# Patient Record
Sex: FEMALE | Employment: OTHER | URBAN - METROPOLITAN AREA
[De-identification: names, ages, dates, MRNs, and addresses within clinical notes are randomized per-mention and may not be internally consistent; named-entity substitution may affect disease eponyms.]

---

## 2018-01-01 ENCOUNTER — APPOINTMENT (OUTPATIENT)
Dept: CT IMAGING | Age: 83
DRG: 193 | End: 2018-01-01
Attending: PHYSICIAN ASSISTANT
Payer: MEDICARE

## 2018-01-01 ENCOUNTER — APPOINTMENT (OUTPATIENT)
Dept: GENERAL RADIOLOGY | Age: 83
DRG: 193 | End: 2018-01-01
Attending: EMERGENCY MEDICINE
Payer: MEDICARE

## 2018-01-01 ENCOUNTER — HOSPITAL ENCOUNTER (INPATIENT)
Age: 83
LOS: 3 days | DRG: 193 | End: 2018-03-22
Attending: EMERGENCY MEDICINE | Admitting: INTERNAL MEDICINE
Payer: MEDICARE

## 2018-01-01 VITALS
OXYGEN SATURATION: 95 % | DIASTOLIC BLOOD PRESSURE: 50 MMHG | BODY MASS INDEX: 22.07 KG/M2 | HEART RATE: 81 BPM | RESPIRATION RATE: 16 BRPM | TEMPERATURE: 98 F | SYSTOLIC BLOOD PRESSURE: 96 MMHG | WEIGHT: 112.43 LBS | HEIGHT: 60 IN

## 2018-01-01 DIAGNOSIS — R09.02 HYPOXIA: Primary | ICD-10-CM

## 2018-01-01 DIAGNOSIS — R06.03 ACUTE RESPIRATORY DISTRESS: ICD-10-CM

## 2018-01-01 DIAGNOSIS — I27.20 PULMONARY HYPERTENSION (HCC): ICD-10-CM

## 2018-01-01 DIAGNOSIS — J18.9 COMMUNITY ACQUIRED PNEUMONIA, UNSPECIFIED LATERALITY: ICD-10-CM

## 2018-01-01 DIAGNOSIS — I35.0 NONRHEUMATIC AORTIC VALVE STENOSIS: ICD-10-CM

## 2018-01-01 LAB
ALBUMIN SERPL-MCNC: 3.5 G/DL (ref 3.4–5)
ALBUMIN/GLOB SERPL: 0.8 {RATIO} (ref 0.8–1.7)
ALP SERPL-CCNC: 47 U/L (ref 45–117)
ALT SERPL-CCNC: 27 U/L (ref 13–56)
ANION GAP SERPL CALC-SCNC: 7 MMOL/L (ref 3–18)
ANION GAP SERPL CALC-SCNC: 8 MMOL/L (ref 3–18)
APPEARANCE UR: ABNORMAL
ARTERIAL PATENCY WRIST A: YES
ARTERIAL PATENCY WRIST A: YES
AST SERPL-CCNC: 46 U/L (ref 15–37)
BACTERIA URNS QL MICRO: ABNORMAL /HPF
BASE EXCESS BLD CALC-SCNC: 11 MMOL/L
BASE EXCESS BLDV CALC-SCNC: 4 MMOL/L
BASOPHILS # BLD: 0 K/UL (ref 0–0.06)
BASOPHILS NFR BLD: 0 % (ref 0–2)
BDY SITE: ABNORMAL
BDY SITE: ABNORMAL
BILIRUB SERPL-MCNC: 0.5 MG/DL (ref 0.2–1)
BILIRUB UR QL: NEGATIVE
BNP SERPL-MCNC: 1571 PG/ML (ref 0–1800)
BODY TEMPERATURE: 100.5
BUN SERPL-MCNC: 21 MG/DL (ref 7–18)
BUN SERPL-MCNC: 25 MG/DL (ref 7–18)
BUN/CREAT SERPL: 22 (ref 12–20)
BUN/CREAT SERPL: 25 (ref 12–20)
CALCIUM SERPL-MCNC: 8.6 MG/DL (ref 8.5–10.1)
CALCIUM SERPL-MCNC: 9 MG/DL (ref 8.5–10.1)
CHLORIDE SERPL-SCNC: 100 MMOL/L (ref 100–108)
CHLORIDE SERPL-SCNC: 93 MMOL/L (ref 100–108)
CO2 SERPL-SCNC: 30 MMOL/L (ref 21–32)
CO2 SERPL-SCNC: 32 MMOL/L (ref 21–32)
COLOR UR: YELLOW
CREAT SERPL-MCNC: 0.85 MG/DL (ref 0.6–1.3)
CREAT SERPL-MCNC: 1.14 MG/DL (ref 0.6–1.3)
DIFFERENTIAL METHOD BLD: ABNORMAL
EOSINOPHIL # BLD: 0 K/UL (ref 0–0.4)
EOSINOPHIL NFR BLD: 0 % (ref 0–5)
EPITH CASTS URNS QL MICRO: ABNORMAL /LPF (ref 0–5)
ERYTHROCYTE [DISTWIDTH] IN BLOOD BY AUTOMATED COUNT: 13.3 % (ref 11.6–14.5)
ERYTHROCYTE [DISTWIDTH] IN BLOOD BY AUTOMATED COUNT: 13.7 % (ref 11.6–14.5)
EST. AVERAGE GLUCOSE BLD GHB EST-MCNC: 114 MG/DL
FLUAV AG NPH QL IA: NEGATIVE
FLUBV AG NOSE QL IA: NEGATIVE
GAS FLOW.O2 O2 DELIVERY SYS: ABNORMAL L/MIN
GAS FLOW.O2 O2 DELIVERY SYS: ABNORMAL L/MIN
GAS FLOW.O2 SETTING OXYMISER: 2.5 L/M
GLOBULIN SER CALC-MCNC: 4.4 G/DL (ref 2–4)
GLUCOSE SERPL-MCNC: 144 MG/DL (ref 74–99)
GLUCOSE SERPL-MCNC: 151 MG/DL (ref 74–99)
GLUCOSE UR STRIP.AUTO-MCNC: NEGATIVE MG/DL
HBA1C MFR BLD: 5.6 % (ref 4.5–5.6)
HCO3 BLD-SCNC: 36.3 MMOL/L (ref 22–26)
HCO3 BLDV-SCNC: 30.8 MMOL/L (ref 23–28)
HCT VFR BLD AUTO: 40.3 % (ref 35–45)
HCT VFR BLD AUTO: 41.2 % (ref 35–45)
HGB BLD-MCNC: 12.7 G/DL (ref 12–16)
HGB BLD-MCNC: 13.3 G/DL (ref 12–16)
HGB UR QL STRIP: ABNORMAL
KETONES UR QL STRIP.AUTO: NEGATIVE MG/DL
L PNEUMO AG UR QL IA: NEGATIVE
LACTATE SERPL-SCNC: 1 MMOL/L (ref 0.4–2)
LEUKOCYTE ESTERASE UR QL STRIP.AUTO: ABNORMAL
LYMPHOCYTES # BLD: 0.8 K/UL (ref 0.9–3.6)
LYMPHOCYTES NFR BLD: 7 % (ref 21–52)
MCH RBC QN AUTO: 31.4 PG (ref 24–34)
MCH RBC QN AUTO: 31.8 PG (ref 24–34)
MCHC RBC AUTO-ENTMCNC: 31.5 G/DL (ref 31–37)
MCHC RBC AUTO-ENTMCNC: 32.3 G/DL (ref 31–37)
MCV RBC AUTO: 100.8 FL (ref 74–97)
MCV RBC AUTO: 97.4 FL (ref 74–97)
MONOCYTES # BLD: 0.7 K/UL (ref 0.05–1.2)
MONOCYTES NFR BLD: 6 % (ref 3–10)
MUCOUS THREADS URNS QL MICRO: NEGATIVE /LPF
NEUTS SEG # BLD: 9.1 K/UL (ref 1.8–8)
NEUTS SEG NFR BLD: 87 % (ref 40–73)
NITRITE UR QL STRIP.AUTO: NEGATIVE
O2/TOTAL GAS SETTING VFR VENT: 21 %
PCO2 BLD: 67.8 MMHG (ref 35–45)
PCO2 BLDV: 67.4 MMHG (ref 41–51)
PH BLD: 7.34 [PH] (ref 7.35–7.45)
PH BLDV: 7.27 [PH] (ref 7.32–7.42)
PH UR STRIP: 5.5 [PH] (ref 5–8)
PLATELET # BLD AUTO: 181 K/UL (ref 135–420)
PLATELET # BLD AUTO: 203 K/UL (ref 135–420)
PMV BLD AUTO: 10 FL (ref 9.2–11.8)
PMV BLD AUTO: 10.3 FL (ref 9.2–11.8)
PO2 BLD: 104 MMHG (ref 80–100)
PO2 BLDV: 43 MMHG (ref 25–40)
POTASSIUM SERPL-SCNC: 3.7 MMOL/L (ref 3.5–5.5)
POTASSIUM SERPL-SCNC: 4.1 MMOL/L (ref 3.5–5.5)
PROT SERPL-MCNC: 7.9 G/DL (ref 6.4–8.2)
PROT UR STRIP-MCNC: 300 MG/DL
RBC # BLD AUTO: 4 M/UL (ref 4.2–5.3)
RBC # BLD AUTO: 4.23 M/UL (ref 4.2–5.3)
RBC #/AREA URNS HPF: ABNORMAL /HPF (ref 0–5)
S PNEUM AG UR QL: POSITIVE
SAO2 % BLD: 97 % (ref 92–97)
SAO2 % BLDV: 69 % (ref 65–88)
SERVICE CMNT-IMP: ABNORMAL
SERVICE CMNT-IMP: ABNORMAL
SODIUM SERPL-SCNC: 133 MMOL/L (ref 136–145)
SODIUM SERPL-SCNC: 137 MMOL/L (ref 136–145)
SP GR UR REFRACTOMETRY: 1.02 (ref 1–1.03)
SPECIMEN TYPE: ABNORMAL
SPECIMEN TYPE: ABNORMAL
TOTAL RESP. RATE, ITRR: 20
TOTAL RESP. RATE, ITRR: 24
UROBILINOGEN UR QL STRIP.AUTO: 0.2 EU/DL (ref 0.2–1)
WBC # BLD AUTO: 10.6 K/UL (ref 4.6–13.2)
WBC # BLD AUTO: 12.5 K/UL (ref 4.6–13.2)
WBC URNS QL MICRO: ABNORMAL /HPF (ref 0–5)

## 2018-01-01 PROCEDURE — 77030013140 HC MSK NEB VYRM -A

## 2018-01-01 PROCEDURE — 74011250637 HC RX REV CODE- 250/637: Performed by: INTERNAL MEDICINE

## 2018-01-01 PROCEDURE — 94640 AIRWAY INHALATION TREATMENT: CPT

## 2018-01-01 PROCEDURE — 83605 ASSAY OF LACTIC ACID: CPT | Performed by: EMERGENCY MEDICINE

## 2018-01-01 PROCEDURE — 81001 URINALYSIS AUTO W/SCOPE: CPT | Performed by: EMERGENCY MEDICINE

## 2018-01-01 PROCEDURE — 77010033678 HC OXYGEN DAILY

## 2018-01-01 PROCEDURE — 36415 COLL VENOUS BLD VENIPUNCTURE: CPT | Performed by: PHYSICIAN ASSISTANT

## 2018-01-01 PROCEDURE — 87450 LEGIONELLA PNEUMOPHILA AG, URINE: CPT | Performed by: INTERNAL MEDICINE

## 2018-01-01 PROCEDURE — 74011250636 HC RX REV CODE- 250/636: Performed by: EMERGENCY MEDICINE

## 2018-01-01 PROCEDURE — 74011000250 HC RX REV CODE- 250: Performed by: INTERNAL MEDICINE

## 2018-01-01 PROCEDURE — 93005 ELECTROCARDIOGRAM TRACING: CPT

## 2018-01-01 PROCEDURE — 74011250637 HC RX REV CODE- 250/637: Performed by: FAMILY MEDICINE

## 2018-01-01 PROCEDURE — 85025 COMPLETE CBC W/AUTO DIFF WBC: CPT | Performed by: EMERGENCY MEDICINE

## 2018-01-01 PROCEDURE — 94760 N-INVAS EAR/PLS OXIMETRY 1: CPT

## 2018-01-01 PROCEDURE — 87449 NOS EACH ORGANISM AG IA: CPT | Performed by: INTERNAL MEDICINE

## 2018-01-01 PROCEDURE — 71045 X-RAY EXAM CHEST 1 VIEW: CPT

## 2018-01-01 PROCEDURE — 87040 BLOOD CULTURE FOR BACTERIA: CPT | Performed by: EMERGENCY MEDICINE

## 2018-01-01 PROCEDURE — 80048 BASIC METABOLIC PNL TOTAL CA: CPT | Performed by: PHYSICIAN ASSISTANT

## 2018-01-01 PROCEDURE — 65660000000 HC RM CCU STEPDOWN

## 2018-01-01 PROCEDURE — 87804 INFLUENZA ASSAY W/OPTIC: CPT | Performed by: EMERGENCY MEDICINE

## 2018-01-01 PROCEDURE — 74011000250 HC RX REV CODE- 250: Performed by: EMERGENCY MEDICINE

## 2018-01-01 PROCEDURE — 97162 PT EVAL MOD COMPLEX 30 MIN: CPT

## 2018-01-01 PROCEDURE — 85027 COMPLETE CBC AUTOMATED: CPT | Performed by: PHYSICIAN ASSISTANT

## 2018-01-01 PROCEDURE — 71250 CT THORAX DX C-: CPT

## 2018-01-01 PROCEDURE — 74011250636 HC RX REV CODE- 250/636: Performed by: INTERNAL MEDICINE

## 2018-01-01 PROCEDURE — 76450000000

## 2018-01-01 PROCEDURE — G8978 MOBILITY CURRENT STATUS: HCPCS

## 2018-01-01 PROCEDURE — 96365 THER/PROPH/DIAG IV INF INIT: CPT

## 2018-01-01 PROCEDURE — G8979 MOBILITY GOAL STATUS: HCPCS

## 2018-01-01 PROCEDURE — 97116 GAIT TRAINING THERAPY: CPT

## 2018-01-01 PROCEDURE — 36600 WITHDRAWAL OF ARTERIAL BLOOD: CPT

## 2018-01-01 PROCEDURE — 83880 ASSAY OF NATRIURETIC PEPTIDE: CPT | Performed by: HOSPITALIST

## 2018-01-01 PROCEDURE — 99285 EMERGENCY DEPT VISIT HI MDM: CPT

## 2018-01-01 PROCEDURE — 96375 TX/PRO/DX INJ NEW DRUG ADDON: CPT

## 2018-01-01 PROCEDURE — 80053 COMPREHEN METABOLIC PANEL: CPT | Performed by: EMERGENCY MEDICINE

## 2018-01-01 PROCEDURE — 82803 BLOOD GASES ANY COMBINATION: CPT

## 2018-01-01 PROCEDURE — 83036 HEMOGLOBIN GLYCOSYLATED A1C: CPT | Performed by: PHYSICIAN ASSISTANT

## 2018-01-01 RX ORDER — IPRATROPIUM BROMIDE AND ALBUTEROL SULFATE 2.5; .5 MG/3ML; MG/3ML
3 SOLUTION RESPIRATORY (INHALATION)
Status: DISCONTINUED | OUTPATIENT
Start: 2018-01-01 | End: 2018-01-01 | Stop reason: HOSPADM

## 2018-01-01 RX ORDER — HALOPERIDOL 2 MG/ML
2 SOLUTION ORAL
Status: DISCONTINUED | OUTPATIENT
Start: 2018-01-01 | End: 2018-01-01 | Stop reason: HOSPADM

## 2018-01-01 RX ORDER — OXYBUTYNIN CHLORIDE 5 MG/1
2.5 TABLET ORAL DAILY
Status: DISCONTINUED | OUTPATIENT
Start: 2018-01-01 | End: 2018-01-01

## 2018-01-01 RX ORDER — ASPIRIN 81 MG/1
81 TABLET ORAL DAILY
Status: DISCONTINUED | OUTPATIENT
Start: 2018-01-01 | End: 2018-01-01

## 2018-01-01 RX ORDER — HEPARIN SODIUM 5000 [USP'U]/ML
5000 INJECTION, SOLUTION INTRAVENOUS; SUBCUTANEOUS EVERY 8 HOURS
Status: DISCONTINUED | OUTPATIENT
Start: 2018-01-01 | End: 2018-01-01

## 2018-01-01 RX ORDER — SODIUM CHLORIDE 0.9 % (FLUSH) 0.9 %
5-10 SYRINGE (ML) INJECTION AS NEEDED
Status: DISCONTINUED | OUTPATIENT
Start: 2018-01-01 | End: 2018-01-01 | Stop reason: HOSPADM

## 2018-01-01 RX ORDER — LISINOPRIL 20 MG/1
20 TABLET ORAL DAILY
Status: DISCONTINUED | OUTPATIENT
Start: 2018-01-01 | End: 2018-01-01

## 2018-01-01 RX ORDER — MORPHINE SULFATE 20 MG/ML
10 SOLUTION ORAL
Status: DISCONTINUED | OUTPATIENT
Start: 2018-01-01 | End: 2018-01-01 | Stop reason: HOSPADM

## 2018-01-01 RX ORDER — ACETAMINOPHEN 325 MG/1
650 TABLET ORAL
Status: DISCONTINUED | OUTPATIENT
Start: 2018-01-01 | End: 2018-01-01 | Stop reason: HOSPADM

## 2018-01-01 RX ORDER — HYDROCODONE BITARTRATE AND ACETAMINOPHEN 5; 325 MG/1; MG/1
1 TABLET ORAL
Status: DISCONTINUED | OUTPATIENT
Start: 2018-01-01 | End: 2018-01-01

## 2018-01-01 RX ORDER — METOPROLOL TARTRATE 25 MG/1
25 TABLET, FILM COATED ORAL 2 TIMES DAILY
Status: DISCONTINUED | OUTPATIENT
Start: 2018-01-01 | End: 2018-01-01

## 2018-01-01 RX ORDER — ACETAMINOPHEN 325 MG/1
650 TABLET ORAL
Status: DISCONTINUED | OUTPATIENT
Start: 2018-01-01 | End: 2018-01-01

## 2018-01-01 RX ORDER — HALOPERIDOL 5 MG/ML
2 INJECTION INTRAMUSCULAR
Status: DISCONTINUED | OUTPATIENT
Start: 2018-01-01 | End: 2018-01-01 | Stop reason: HOSPADM

## 2018-01-01 RX ORDER — IPRATROPIUM BROMIDE AND ALBUTEROL SULFATE 2.5; .5 MG/3ML; MG/3ML
3 SOLUTION RESPIRATORY (INHALATION)
Status: COMPLETED | OUTPATIENT
Start: 2018-01-01 | End: 2018-01-01

## 2018-01-01 RX ORDER — ACETAMINOPHEN 650 MG/1
650 SUPPOSITORY RECTAL
Status: DISCONTINUED | OUTPATIENT
Start: 2018-01-01 | End: 2018-01-01 | Stop reason: HOSPADM

## 2018-01-01 RX ORDER — MORPHINE SULFATE 2 MG/ML
2 INJECTION, SOLUTION INTRAMUSCULAR; INTRAVENOUS
Status: DISCONTINUED | OUTPATIENT
Start: 2018-01-01 | End: 2018-01-01

## 2018-01-01 RX ORDER — SODIUM CHLORIDE 9 MG/ML
75 INJECTION, SOLUTION INTRAVENOUS CONTINUOUS
Status: DISCONTINUED | OUTPATIENT
Start: 2018-01-01 | End: 2018-01-01

## 2018-01-01 RX ORDER — LORAZEPAM 2 MG/ML
1 CONCENTRATE ORAL
Status: DISCONTINUED | OUTPATIENT
Start: 2018-01-01 | End: 2018-01-01 | Stop reason: HOSPADM

## 2018-01-01 RX ORDER — IPRATROPIUM BROMIDE AND ALBUTEROL SULFATE 2.5; .5 MG/3ML; MG/3ML
3 SOLUTION RESPIRATORY (INHALATION)
Status: DISCONTINUED | OUTPATIENT
Start: 2018-01-01 | End: 2018-01-01

## 2018-01-01 RX ORDER — PRAVASTATIN SODIUM 20 MG/1
40 TABLET ORAL DAILY
Status: DISCONTINUED | OUTPATIENT
Start: 2018-01-01 | End: 2018-01-01

## 2018-01-01 RX ORDER — GUAIFENESIN 100 MG/5ML
81 LIQUID (ML) ORAL DAILY
COMMUNITY

## 2018-01-01 RX ORDER — GUAIFENESIN 600 MG/1
600 TABLET, EXTENDED RELEASE ORAL
Status: DISCONTINUED | OUTPATIENT
Start: 2018-01-01 | End: 2018-01-01

## 2018-01-01 RX ADMIN — WATER 2 G: 1 INJECTION INTRAMUSCULAR; INTRAVENOUS; SUBCUTANEOUS at 23:55

## 2018-01-01 RX ADMIN — MORPHINE SULFATE 10 MG: 20 SOLUTION ORAL at 20:55

## 2018-01-01 RX ADMIN — METOPROLOL TARTRATE 25 MG: 25 TABLET ORAL at 21:02

## 2018-01-01 RX ADMIN — IPRATROPIUM BROMIDE AND ALBUTEROL SULFATE 3 ML: .5; 3 SOLUTION RESPIRATORY (INHALATION) at 19:51

## 2018-01-01 RX ADMIN — MORPHINE SULFATE 10 MG: 20 SOLUTION ORAL at 09:31

## 2018-01-01 RX ADMIN — WATER 1 G: 1 INJECTION INTRAMUSCULAR; INTRAVENOUS; SUBCUTANEOUS at 23:57

## 2018-01-01 RX ADMIN — SODIUM CHLORIDE 1365 ML: 900 INJECTION, SOLUTION INTRAVENOUS at 23:55

## 2018-01-01 RX ADMIN — SODIUM CHLORIDE 75 ML/HR: 900 INJECTION, SOLUTION INTRAVENOUS at 23:57

## 2018-01-01 RX ADMIN — GUAIFENESIN 600 MG: 600 TABLET, EXTENDED RELEASE ORAL at 01:09

## 2018-01-01 RX ADMIN — METOPROLOL TARTRATE 25 MG: 25 TABLET ORAL at 09:33

## 2018-01-01 RX ADMIN — IPRATROPIUM BROMIDE AND ALBUTEROL SULFATE 3 ML: .5; 3 SOLUTION RESPIRATORY (INHALATION) at 23:47

## 2018-01-01 RX ADMIN — LISINOPRIL 20 MG: 20 TABLET ORAL at 09:34

## 2018-01-01 RX ADMIN — MORPHINE SULFATE 10 MG: 20 SOLUTION ORAL at 16:48

## 2018-01-01 RX ADMIN — LISINOPRIL 20 MG: 20 TABLET ORAL at 09:33

## 2018-01-01 RX ADMIN — HALOPERIDOL LACTATE 2 MG: 2 SOLUTION, CONCENTRATE ORAL at 09:36

## 2018-01-01 RX ADMIN — HEPARIN SODIUM 5000 UNITS: 5000 INJECTION, SOLUTION INTRAVENOUS; SUBCUTANEOUS at 09:37

## 2018-01-01 RX ADMIN — HEPARIN SODIUM 5000 UNITS: 5000 INJECTION, SOLUTION INTRAVENOUS; SUBCUTANEOUS at 01:56

## 2018-01-01 RX ADMIN — IPRATROPIUM BROMIDE AND ALBUTEROL SULFATE 3 ML: .5; 3 SOLUTION RESPIRATORY (INHALATION) at 07:05

## 2018-01-01 RX ADMIN — OXYBUTYNIN CHLORIDE 2.5 MG: 5 TABLET ORAL at 09:35

## 2018-01-01 RX ADMIN — IPRATROPIUM BROMIDE AND ALBUTEROL SULFATE 3 ML: .5; 3 SOLUTION RESPIRATORY (INHALATION) at 07:24

## 2018-01-01 RX ADMIN — PRAVASTATIN SODIUM 40 MG: 20 TABLET ORAL at 09:33

## 2018-01-01 RX ADMIN — ASPIRIN 81 MG: 81 TABLET, COATED ORAL at 09:33

## 2018-01-01 RX ADMIN — HEPARIN SODIUM 5000 UNITS: 5000 INJECTION, SOLUTION INTRAVENOUS; SUBCUTANEOUS at 19:06

## 2018-01-01 RX ADMIN — MORPHINE SULFATE 10 MG: 20 SOLUTION ORAL at 14:56

## 2018-01-01 RX ADMIN — PRAVASTATIN SODIUM 40 MG: 20 TABLET ORAL at 09:34

## 2018-01-01 RX ADMIN — SODIUM CHLORIDE 75 ML/HR: 900 INJECTION, SOLUTION INTRAVENOUS at 01:44

## 2018-01-01 RX ADMIN — HEPARIN SODIUM 5000 UNITS: 5000 INJECTION, SOLUTION INTRAVENOUS; SUBCUTANEOUS at 01:10

## 2018-01-01 RX ADMIN — IPRATROPIUM BROMIDE AND ALBUTEROL SULFATE 3 ML: .5; 3 SOLUTION RESPIRATORY (INHALATION) at 15:07

## 2018-01-01 RX ADMIN — ASPIRIN 81 MG: 81 TABLET, COATED ORAL at 09:34

## 2018-01-01 RX ADMIN — METOPROLOL TARTRATE 25 MG: 25 TABLET ORAL at 09:34

## 2018-01-01 RX ADMIN — OXYBUTYNIN CHLORIDE 2.5 MG: 5 TABLET ORAL at 09:33

## 2018-01-01 RX ADMIN — SODIUM CHLORIDE 500 MG: 900 INJECTION, SOLUTION INTRAVENOUS at 23:59

## 2018-01-01 RX ADMIN — IPRATROPIUM BROMIDE AND ALBUTEROL SULFATE 3 ML: .5; 3 SOLUTION RESPIRATORY (INHALATION) at 02:27

## 2018-01-01 RX ADMIN — MORPHINE SULFATE 10 MG: 20 SOLUTION ORAL at 07:44

## 2018-01-01 RX ADMIN — SODIUM CHLORIDE 500 MG: 900 INJECTION, SOLUTION INTRAVENOUS at 00:46

## 2018-01-01 RX ADMIN — MORPHINE SULFATE 10 MG: 20 SOLUTION ORAL at 13:37

## 2018-01-01 RX ADMIN — IPRATROPIUM BROMIDE AND ALBUTEROL SULFATE 3 ML: .5; 3 SOLUTION RESPIRATORY (INHALATION) at 01:00

## 2018-01-01 RX ADMIN — HEPARIN SODIUM 5000 UNITS: 5000 INJECTION, SOLUTION INTRAVENOUS; SUBCUTANEOUS at 09:36

## 2018-03-18 NOTE — Clinical Note
Status[de-identified] Inpatient [101] Type of Bed: Telemetry [19] Inpatient Hospitalization Certified Necessary for the Following Reasons: 3. Patient receiving treatment that can only be provided in an inpatient setting (further clarification in H&P documentation) Admitting Diagnosis: Hypoxia [906295] Admitting Physician: Maru Navarrete [[id number]] Attending Physician: Maru Navarrete [[id number]] Estimated Length of Stay: 3-4 Midnights Discharge Plan[de-identified] Home with Office Follow-up

## 2018-03-19 PROBLEM — J06.9 ACUTE RESPIRATORY DISEASE: Status: ACTIVE | Noted: 2018-01-01

## 2018-03-19 PROBLEM — J18.9 CAP (COMMUNITY ACQUIRED PNEUMONIA): Status: ACTIVE | Noted: 2018-01-01

## 2018-03-19 PROBLEM — E87.1 HYPONATREMIA: Status: ACTIVE | Noted: 2018-01-01

## 2018-03-19 PROBLEM — R09.02 HYPOXIA: Status: ACTIVE | Noted: 2018-01-01

## 2018-03-19 PROBLEM — R06.03 ACUTE RESPIRATORY DISTRESS: Status: ACTIVE | Noted: 2018-01-01

## 2018-03-19 NOTE — ED PROVIDER NOTES
EMERGENCY DEPARTMENT HISTORY AND PHYSICAL EXAM    Date: 3/18/2018  Patient Name: Marilia Rodriguez    History of Presenting Illness     Chief Complaint   Patient presents with    Shortness of Breath    Cough         History Provided By: Patient    Chief Complaint: SOB  Duration: 3 days   Timing:  Constant  Location: Chest  Modifying Factors: Notes no relief with OTC cough and congestion medication  Associated Symptoms: congestion, cough, lightheadedness, and right ear pain    Additional History (Context):   11:00 PM  Marilia Morris is a 80 y.o. female with PMHx HTN, aortic stenosis, HTN, HLD who presents ambulatory to the emergency department C/O SOB, onset 3 days ago.  reports that she has been taking OTC cough and congestion medications with no relief. Associated sxs include congestion, cough, lightheadedness, and right ear pain. Sats were 78-85% in triage. Denies any h/o heart problems. Pt denies CP, tobacco use, or any other sxs or complaints.      PCP: Saeed Cheema MD    Current Facility-Administered Medications   Medication Dose Route Frequency Provider Last Rate Last Dose    [START ON 3/20/2018] cefTRIAXone (ROCEPHIN) 1 g in sterile water (preservative free) 10 mL IV syringe  1 g IntraVENous Q24H Carmelo Phan MD        lisinopril (PRINIVIL, ZESTRIL) tablet 20 mg  20 mg Oral DAILY Kayla Haque MD        metoprolol tartrate (LOPRESSOR) tablet 25 mg  25 mg Oral BID Kayla Haque MD        pravastatin (PRAVACHOL) tablet 40 mg  40 mg Oral DAILY Kayla Haque MD        aspirin delayed-release tablet 81 mg  81 mg Oral DAILY Kayla Haque MD        oxybutynin (DITROPAN) tablet 2.5 mg  2.5 mg Oral DAILY Kayla Haque MD        0.9% sodium chloride infusion  75 mL/hr IntraVENous CONTINUOUS Kayla Haque MD 75 mL/hr at 03/19/18 0144 75 mL/hr at 03/19/18 0144    acetaminophen (TYLENOL) tablet 650 mg  650 mg Oral Q4H PRN Kayla Haque MD        HYDROcodone-acetaminophen (NORCO) 5-325 mg per tablet 1 Tab  1 Tab Oral Q4H PRN Quan Kohler MD        heparin (porcine) injection 5,000 Units  5,000 Units SubCUTAneous Rosalina Lan MD   5,000 Units at 03/19/18 0156    albuterol-ipratropium (DUO-NEB) 2.5 MG-0.5 MG/3 ML  3 mL Nebulization Q6H RT Carmelo Phan MD   3 mL at 03/19/18 0227    guaiFENesin ER (MUCINEX) tablet 600 mg  600 mg Oral BID PRN Quan Kohler MD        sodium chloride (NS) flush 5-10 mL  5-10 mL IntraVENous PRN Unknown MD Luly        azithromycin (ZITHROMAX) 500 mg in 0.9% sodium chloride 250 mL IVPB  500 mg IntraVENous Q24H Unknown MD Luly 250 mL/hr at 03/18/18 2359 500 mg at 03/18/18 2359       Past History     Past Medical History:  Past Medical History:   Diagnosis Date    Aortic stenosis     Essential hypertension, benign     Hyperlipidemia     Hypertension     Hypoxia        Past Surgical History:  Past Surgical History:   Procedure Laterality Date    HX APPENDECTOMY      Age 25    HX BREAST BIOPSY      benign breast biopsy left side    HX HIP REPLACEMENT  2008       Family History:  Family History   Problem Relation Age of Onset    Heart Failure Mother 78       Social History:  Social History   Substance Use Topics    Smoking status: Never Smoker    Smokeless tobacco: Never Used    Alcohol use No       Allergies:  No Known Allergies      Review of Systems   Review of Systems   HENT: Positive for congestion and ear discharge (right). Respiratory: Positive for cough and shortness of breath. Neurological: Positive for light-headedness. All other systems reviewed and are negative. Physical Exam     Vitals:    03/19/18 0147 03/19/18 0227 03/19/18 0231 03/19/18 0348   BP: 161/87   107/48   Pulse: 82   73   Resp: 24   25   Temp: 98.4 °F (36.9 °C)   98.4 °F (36.9 °C)   SpO2: 95% 94%  96%   Weight:   51.3 kg (113 lb 1.5 oz)    Height:         Physical Exam   Nursing note and vitals reviewed.   Constitutional: Elderly and ill appearing, moderate distress  Head: Normocephalic, Atraumatic  Eyes: EOMI  Neck: Supple  Cardiovascular: Regular rate and rhythm, no murmurs, rubs, or gallops  Chest: Normal work of breathing and chest excursion bilaterally  Lungs: [place] with increased work of breathing and coarse breath sounds in all lung fields  Abdomen: Soft, non tender, non distended, normoactive bowel sounds  Back: No evidence of trauma or deformity  Extremities: No evidence of trauma or deformity, mild BLE edema  Skin: Warm and dry, normal cap refill  Neuro: Alert and appropriate  Psychiatric: Normal mood and affect    Diagnostic Study Results     Labs -     Recent Results (from the past 12 hour(s))   EKG, 12 LEAD, INITIAL    Collection Time: 03/18/18 11:32 PM   Result Value Ref Range    Ventricular Rate 72 BPM    Atrial Rate 72 BPM    P-R Interval 152 ms    QRS Duration 72 ms    Q-T Interval 368 ms    QTC Calculation (Bezet) 402 ms    Calculated P Axis 45 degrees    Calculated R Axis -35 degrees    Calculated T Axis 35 degrees    Diagnosis       Normal sinus rhythm  Left axis deviation  Abnormal ECG  When compared with ECG of 22-SEP-2013 13:31,  Vent. rate has decreased BY  38 BPM  Nonspecific T wave abnormality no longer evident in Lateral leads     LACTIC ACID    Collection Time: 03/18/18 11:42 PM   Result Value Ref Range    Lactic acid 1.0 0.4 - 2.0 MMOL/L   POC G3    Collection Time: 03/18/18 11:44 PM   Result Value Ref Range    Device: NASAL CANNULA      Flow rate (POC) 2.5 L/M    pH (POC) 7.342 (L) 7.35 - 7.45      pCO2 (POC) 67.8 (H) 35.0 - 45.0 MMHG    pO2 (POC) 104 (H) 80 - 100 MMHG    HCO3 (POC) 36.3 (H) 22 - 26 MMOL/L    sO2 (POC) 97 92 - 97 %    Base excess (POC) 11 mmol/L    Allens test (POC) YES      Total resp.  rate 20      Site LEFT RADIAL      Patient temp. 100.5      Specimen type (POC) ARTERIAL      Performed by Zafar Greenfield    CBC WITH AUTOMATED DIFF    Collection Time: 03/18/18 11:45 PM   Result Value Ref Range    WBC 10.6 4.6 - 13.2 K/uL    RBC 4.23 4.20 - 5.30 M/uL    HGB 13.3 12.0 - 16.0 g/dL    HCT 41.2 35.0 - 45.0 %    MCV 97.4 (H) 74.0 - 97.0 FL    MCH 31.4 24.0 - 34.0 PG    MCHC 32.3 31.0 - 37.0 g/dL    RDW 13.3 11.6 - 14.5 %    PLATELET 655 589 - 599 K/uL    MPV 10.0 9.2 - 11.8 FL    NEUTROPHILS 87 (H) 40 - 73 %    LYMPHOCYTES 7 (L) 21 - 52 %    MONOCYTES 6 3 - 10 %    EOSINOPHILS 0 0 - 5 %    BASOPHILS 0 0 - 2 %    ABS. NEUTROPHILS 9.1 (H) 1.8 - 8.0 K/UL    ABS. LYMPHOCYTES 0.8 (L) 0.9 - 3.6 K/UL    ABS. MONOCYTES 0.7 0.05 - 1.2 K/UL    ABS. EOSINOPHILS 0.0 0.0 - 0.4 K/UL    ABS. BASOPHILS 0.0 0.0 - 0.06 K/UL    DF AUTOMATED     METABOLIC PANEL, COMPREHENSIVE    Collection Time: 03/18/18 11:45 PM   Result Value Ref Range    Sodium 133 (L) 136 - 145 mmol/L    Potassium 4.1 3.5 - 5.5 mmol/L    Chloride 93 (L) 100 - 108 mmol/L    CO2 32 21 - 32 mmol/L    Anion gap 8 3.0 - 18 mmol/L    Glucose 151 (H) 74 - 99 mg/dL    BUN 25 (H) 7.0 - 18 MG/DL    Creatinine 1.14 0.6 - 1.3 MG/DL    BUN/Creatinine ratio 22 (H) 12 - 20      GFR est AA 55 (L) >60 ml/min/1.73m2    GFR est non-AA 45 (L) >60 ml/min/1.73m2    Calcium 9.0 8.5 - 10.1 MG/DL    Bilirubin, total 0.5 0.2 - 1.0 MG/DL    ALT (SGPT) 27 13 - 56 U/L    AST (SGOT) 46 (H) 15 - 37 U/L    Alk.  phosphatase 47 45 - 117 U/L    Protein, total 7.9 6.4 - 8.2 g/dL    Albumin 3.5 3.4 - 5.0 g/dL    Globulin 4.4 (H) 2.0 - 4.0 g/dL    A-G Ratio 0.8 0.8 - 1.7     INFLUENZA A & B AG (RAPID TEST)    Collection Time: 03/18/18 11:45 PM   Result Value Ref Range    Influenza A Antigen NEGATIVE  NEG      Influenza B Antigen NEGATIVE  NEG     URINALYSIS W/ RFLX MICROSCOPIC    Collection Time: 03/19/18 12:17 AM   Result Value Ref Range    Color YELLOW      Appearance CLOUDY      Specific gravity 1.019 1.005 - 1.030      pH (UA) 5.5 5.0 - 8.0      Protein 300 (A) NEG mg/dL    Glucose NEGATIVE  NEG mg/dL    Ketone NEGATIVE  NEG mg/dL    Bilirubin NEGATIVE  NEG      Blood MODERATE (A) NEG      Urobilinogen 0.2 0.2 - 1.0 EU/dL    Nitrites NEGATIVE  NEG Leukocyte Esterase TRACE (A) NEG     URINE MICROSCOPIC ONLY    Collection Time: 03/19/18 12:17 AM   Result Value Ref Range    WBC 10 to 15 0 - 5 /hpf    RBC 2 to 5 0 - 5 /hpf    Epithelial cells FEW 0 - 5 /lpf    Bacteria 4+ (A) NEG /hpf    Mucus NEGATIVE  NEG /lpf       Radiologic Studies -    XR CHEST PORT    (Results Pending)     3:58 AM  RADIOLOGY FINDINGS  Chest X-ray shows infiltrate in RLL  Pending review by Radiologist  Recorded by Claire Salguero ED Scribe, as dictated by Leesa Angeles MD    CT Results  (Last 48 hours)    None        CXR Results  (Last 48 hours)    None            Medical Decision Making   I am the first provider for this patient. I reviewed the vital signs, available nursing notes, past medical history, past surgical history, family history and social history. Vital Signs-Reviewed the patient's vital signs. Pulse Oximetry Analysis - 78% on RA. Receiving NC. Cardiac Monitor:  Rate: 75 bpm  Rhythm: NSR    EKG interpretation: (Preliminary)  23:32  NSR at 72 bpm. QRS duration at 72 ms.   EKG read by Elsi Banda MD at 23:40     Records Reviewed: Nursing Notes    Provider Notes (Medical Decision Making):     Procedures:  Procedures    MEDICATIONS GIVEN:  Medications   sodium chloride (NS) flush 5-10 mL (not administered)   azithromycin (ZITHROMAX) 500 mg in 0.9% sodium chloride 250 mL IVPB (500 mg IntraVENous Continued On Admission 3/19/18 0100)   cefTRIAXone (ROCEPHIN) 1 g in sterile water (preservative free) 10 mL IV syringe (not administered)   lisinopril (PRINIVIL, ZESTRIL) tablet 20 mg (not administered)   metoprolol tartrate (LOPRESSOR) tablet 25 mg (not administered)   pravastatin (PRAVACHOL) tablet 40 mg (not administered)   aspirin delayed-release tablet 81 mg (not administered)   oxybutynin (DITROPAN) tablet 2.5 mg (not administered)   0.9% sodium chloride infusion (75 mL/hr IntraVENous New Bag 3/19/18 0144)   acetaminophen (TYLENOL) tablet 650 mg (not administered) HYDROcodone-acetaminophen (NORCO) 5-325 mg per tablet 1 Tab (not administered)   heparin (porcine) injection 5,000 Units (5,000 Units SubCUTAneous Given 3/19/18 0156)   albuterol-ipratropium (DUO-NEB) 2.5 MG-0.5 MG/3 ML (3 mL Nebulization Given 3/19/18 0227)   guaiFENesin ER (MUCINEX) tablet 600 mg (not administered)   sodium chloride 0.9 % bolus infusion 1,365 mL (1,365 mL IntraVENous Continued On Admission 3/19/18 0100)   albuterol-ipratropium (DUO-NEB) 2.5 MG-0.5 MG/3 ML (3 mL Nebulization Given 3/18/18 1617)       ED Course:   11:00 PM   Initial assessment performed. The patients presenting problems have been discussed, and they are in agreement with the care plan formulated and outlined with them. I have encouraged them to ask questions as they arise throughout their visit. 12:14 AM Discussed patient's history, exam, and available diagnostics results with Francoise Perez MD, internal medicine, who accepts to telemetry. Diagnosis and Disposition     Discussion:  80 y.o. female presenting with hx, exam, and imaging consistent with PNA. Met sepsis criteria, sepsis protocol initiated. CAP coverage given and discussed with hospitalist for further inpatient management. Critical Care Time: 36 mins  12:14 AM  I have spent 36 minutes of critical care time involved in lab review, consultations with specialist, family decision-making, and documentation. During this entire length of time I was immediately available to the patient. Critical Care: The reason for providing this level of medical care for this critically ill patient was due a critical illness that impaired one or more vital organ systems such that there was a high probability of imminent or life threatening deterioration in the patients condition.  This care involved high complexity decision making to assess, manipulate, and support vital system functions, to treat this degree vital organ system failure and to prevent further life threatening deterioration of the patients condition. Core Measures:  For Hospitalized Patients:    1. Hospitalization Decision Time:  The decision to hospitalize the patient was made by Sandra Mir MD at 12:18 AM on 3/18/2018    2. Aspirin: Aspirin was not given because the patient did not present with a stroke at the time of their Emergency Department evaluation    12:18 AM  Patient is being admitted to the hospital by Corey Forbes MD. The results of their tests and reasons for their admission have been discussed with them and/or available family. They convey agreement and understanding for the need to be admitted and for their admission diagnosis. CONDITIONS ON ADMISSION:  Sepsis is present at the time of admission. Deep Vein Thrombosis is not present at the time of admission. Thrombosis is not present at the time of admission. Urinary Tract Infection is not present at the time of admission. Pneumonia is present at the time of admission. MRSA is not present at the time of admission. Wound infection is not present at the time of admission. Pressure Ulcer is not present at the time of admission. CLINICAL IMPRESSION:    1. Hypoxia    2. Community acquired pneumonia, unspecified laterality        PLAN: Admit to telemetry  _______________________________    Attestations: This note is prepared by Karolina Pineda, acting as Scribe for Sandra Mir MD.    Sandra Mir MD:  The scribe's documentation has been prepared under my direction and personally reviewed by me in its entirety.   I confirm that the note above accurately reflects all work, treatment, procedures, and medical decision making performed by me.  _______________________________

## 2018-03-19 NOTE — ROUTINE PROCESS
TRANSFER - OUT REPORT:    Verbal report given to Geneva Anderson RN(name) on June D Rona Lerner  being transferred to [address] [place] [address](unit) for routine progression of care       Report consisted of patients Situation, Background, Assessment and   Recommendations(SBAR). Information from the following report(s) SBAR, ED Summary, MAR, Recent Results and Cardiac Rhythm NSR was reviewed with the receiving nurse. Lines:   Peripheral IV 03/18/18 Right Wrist (Active)   Site Assessment Clean, dry, & intact 3/19/2018 12:08 AM   Phlebitis Assessment 0 3/19/2018 12:08 AM   Infiltration Assessment 0 3/19/2018 12:08 AM   Dressing Status Clean, dry, & intact 3/19/2018 12:08 AM        Opportunity for questions and clarification was provided.       Patient transported with:   Monitor  O2 @ 2.5 liters  Tech

## 2018-03-19 NOTE — ED TRIAGE NOTES
Patient with complaints of shortness of breath and cough.  states that she has been coughing Thursday. Patient with sats between 78-85%. Sepsis Screening completed    ( x )Patient meets SIRS criteria. (  )Patient does not meet SIRS criteria.       SIRS Criteria is achieved when two or more of the following are present   Temperature < 96.8°F (36°C) or > 100.9°F (38.3°C)   Heart Rate > 90 beats per minute   Respiratory Rate > 20 breaths per minute   WBC count > 12,000 or <4,000 or > 10% bands

## 2018-03-19 NOTE — PROGRESS NOTES
0730 Assumed patient care from off going nurse BELKYS Alves RN. Patient is alert x 4 resting in bed and appears to be in no sign of distress. Bed left in lowest position with bed alarm set and call bell left within reach.

## 2018-03-19 NOTE — PROGRESS NOTES
Readmission Risk Assessment:     Moderate Risk and MSSP/Good Help ACO patients    RRAT Score:  13-20    Initial Assessment:  Chart reviewed, attended IDR. Pt admitted from home for acute resp distress- pt with CHF, pneumonia. Per spouse in [facility], pt is not on home oxygen, and PA anticipates pt will be in hospital 2-3 days. PT/OT ordered- CM will follow for discharge planning. Emergency Contact:  See face sheet    Pertinent Medical Hx:      HTN, hypoxia, hyperlipidemia    PCP/Specialists: [place]:       DME:      Has RW, does not use    Moderate Risk Care Transition Plan:  1. Evaluate for [place] or H2H, SNF, acute rehab, community care coordination of resources. 2. Involve patient/caregiver in assessment, planning, education and implement of intervention. 3. CM daily patient care huddles/interdisciplinary rounds. 4. PCP/Specialist appointment within 5  7 days made prior to discharge. 5. Facilitate transportation and logistics for follow-up appointments. 6. Medication reconciliation [id number]  7. Formal handoff between hospital provider and post-acute provider to transition patient  Handoff to [id number] Nurse Navigator or PCP practice.

## 2018-03-19 NOTE — PROGRESS NOTES
Problem: Mobility Impaired (Adult and Pediatric)  Goal: *Acute Goals and Plan of Care (Insert Text)  Physical Therapy Goals LT/ST  Initiated 3/19/2018 and to be accomplished within 3 day(s)  1. Patient will move from supine <> sit with S in prep for out of bed activity and change of position. 2.  Patient will perform sit<> stand with S with LRAD in prep for transfers/ambulation. 3.  Patient will transfer from bed <> chair with S with LRAD for time up in chair for completion of ADL activity. 4.  Patient will ambulate 150 feet with LRAD/S for improved functional mobility/safe discharge. 5.  Patient will negotiate 1 box step with min A for home re-entry. Outcome: Progressing Towards Goal  physical Therapy EVALUATION    Patient: Marilia Robbins (80 y.o. female)  Date: 3/19/2018  Primary Diagnosis: Hypoxia  Acute respiratory disease  CAP (community acquired pneumonia)  Precautions:Fall    ASSESSMENT :  Based on the objective data described below, the patient is an 81 yo F who presents with decreased independence in functional mobility with regard to bed mobility, transfers, gait quality, balance and activity tolerance due to current illness. Patient reports no pain during session; intermittent coughing noted. Demonstrated bed mobility with CGA, transfers with min/mod HHA and gait with mod HHA 10ft. Snow slow, with decreased step length and intermittent shuffling. Pt O2 sat 97% pre; 90% post gt and required 60sec to reach 95% post activity. Pt left sitting up in bed w/breakfast meal set up and spouse present to assist with same as needed. Nurse Jessica Gunter notified of above. Recommend HHPT vs SNF for follow up physical therapy (pending progress) upon discharge to reach maximal level of independence/safety with functional mobility. Pt Education: pt educated in safety/technique during functional mobility tasks     Patient will benefit from skilled intervention to address the above impairments.   Patients rehabilitation potential is considered to be Good  Factors which may influence rehabilitation potential include:   []         None noted  []         Mental ability/status  [x]         Medical condition  []         Home/family situation and support systems  []         Safety awareness  []         Pain tolerance/management  []         Other:      PLAN :  Recommendations and Planned Interventions:  [x]           Bed Mobility Training             []    Neuromuscular Re-Education  [x]           Transfer Training                   []    Orthotic/Prosthetic Training  [x]           Gait Training                          []    Modalities  [x]           Therapeutic Exercises          []    Edema Management/Control  [x]           Therapeutic Activities            [x]    Patient and Family Training/Education  []           Other (comment):    Frequency/Duration: Patient will be followed by physical therapy 1-2 times per day to address goals. Discharge Recommendations: Home Health vs [facility]  Further Equipment Recommendations for Discharge: N/A     SUBJECTIVE:   Patient stated I'm sleepy.     OBJECTIVE DATA SUMMARY:     Past Medical History:   Diagnosis Date    Aortic stenosis     Essential hypertension, benign     Hyperlipidemia     Hypertension     Hypoxia      Past Surgical History:   Procedure Laterality Date    HX APPENDECTOMY      Age 25    HX BREAST BIOPSY      benign breast biopsy left side    HX HIP REPLACEMENT  2008     Barriers to Learning/Limitations: None  Compensate with: N/A  Prior Level of Function/Home Situation: independence reported PTA  Home Situation  Home Environment: Private residence  # Steps to Enter: 1  Rails to Enter: No  One/Two Story Residence: One story  Living Alone: No  Support Systems: Spouse/Significant Other/Partner  Patient Expects to be Discharged to[de-identified] Private residence  Current DME Used/Available at Home: Walker, rolling (amb indepent w/o device PTA)  Tub or Shower Type: Tub/Shower combination  Critical Behavior:  Neurologic State: Alert; Appropriate for age  Orientation Level: Oriented X4  Cognition: Follows commands  Safety/Judgement: Fall prevention; Awareness of environment  Psychosocial  Patient Behaviors: Calm; Cooperative  Family  Behaviors: Calm;Supportive  Purposeful Interaction: Yes  Pt Identified Daily Priority: Clinical issues (comment)  Caritas Process: Establish trust;Attend basic human needs;Create healing environment  Caring Interventions: Reassure  Reassure: Therapeutic listening; Informing;Caring rounds  Family  Behaviors: Calm;Supportive  Strength:    Strength: Generally decreased, functional  Tone & Sensation:   Tone: Normal  Sensation: Intact  Range Of Motion:  AROM: Generally decreased, functional  PROM: Generally decreased, functional  Functional Mobility:  Bed Mobility:  Rolling: Stand-by assistance; Additional time  Supine to Sit: Contact guard assistance  Sit to Supine: Contact guard assistance; Additional time  Scooting: Contact guard assistance  Transfers:  Sit to Stand: Minimum assistance; Moderate assistance  Stand to Sit: Minimum assistance; Moderate assistance  Balance:   Sitting: Intact  Standing: Impaired; With support  Standing - Static: Fair;Constant support  Standing - Dynamic : Fair Nancy Lam-)  Ambulation/Gait Training:  Distance (ft): 10 Feet (ft)  Assistive Device: Gait belt  Ambulation - Level of Assistance: Moderate assistance  Gait Description (WDL): Exceptions to WDL  Gait Abnormalities: Decreased step clearance;Shuffling gait  Base of Support: Narrowed  Speed/Snow: Slow  Step Length: Right shortened;Left shortened  Interventions: Safety awareness training;Verbal cues  Pain:  Pain Scale 1: Numeric (0 - 10)  Pain Intensity 1: 0  Activity Tolerance:   Fair   Please refer to the flowsheet for vital signs taken during this treatment.   After treatment:   []         Patient left in no apparent distress sitting up in chair  [x]         Patient left in no apparent distress in bed with breakfast meal set up  [x]         Call bell left within reach  [x]         Nursing notified-Jessica  [x]         Caregiver present-spouse  []         Bed alarm activated    COMMUNICATION/EDUCATION:   [x]         Fall prevention education was provided and the patient/caregiver indicated understanding. [x]         Patient/family have participated as able in goal setting and plan of care. [x]         Patient/family agree to work toward stated goals and plan of care. []         Patient understands intent and goals of therapy, but is neutral about his/her participation. []         Patient is unable to participate in goal setting and plan of care. Eval Complexity: History: HIGH Complexity :3+ comorbidities / personal factors will impact the outcome/ POC Exam:MEDIUM Complexity : 3 Standardized tests and measures addressing body structure, function, activity limitation and / or participation in recreation  Presentation: MEDIUM Complexity : Evolving with changing characteristics  Clinical Decision Making:Medium Complexity amb <30ft w/mod assist Overall Complexity:MEDIUM    G-Codes (GP)  Mobility  [id number] Current  CK= 40-59%   Goal  CI= 1-19%  The severity rating is based on the functional mobility assessment.     Thank you for this referral.  Charmaine Nolan, PT   Time Calculation: 30 mins

## 2018-03-19 NOTE — PROGRESS NOTES
Patient seen this AM after morning admission for follow up. Patient with  at bedside. On supplemental oxygen, 3L/min nasal cannula. Begin weaning as tolerable and as long as O2 sats maintain. Await sputum cultures. Continue duonebs prn, mucinex prn, and IV abx as ordered. Will monitor Na and renal fx with daily BMP. Patient is in no acute distress at time of exam, no questions or concerns. Agreeable to PT/OT as ordered. Patient has been afebrile since admission, BP normalizing, will continue to monitor both. Agree with plan set in place by Dr Tabitha Altamirano.

## 2018-03-19 NOTE — H&P
History & Physical    Patient: Marilia Montes MRN: [id number]  CSN: [id number]    YOB: 1929  Age: 80 y.o. Sex: female      DOA: 3/18/2018  Primary Care Provider:  Xenia Bardales MD      Assessment/Plan     Patient Active Problem List   Diagnosis Code    Essential hypertension, benign I10    Aortic stenosis I35.0    Paroxysmal a-fib ([facility]) I48.0    ARF (acute renal failure) ([facility]) N17.9    Chronic diastolic CHF (congestive heart failure) ([facility]) I50.32    Hypoxia R09.02    CAP (community acquired pneumonia) J18.9    Acute respiratory distress R06.03    Hyponatremia E87.1    Acute respiratory disease J06.9       1. Acute Resp Distress with Hypercarbia and Hypoxia on RA with sat in low 80s   2. CAP  3. Chronic diastolic CHF, compensated   4. Moderate AS, per hx   5. HTN  6. Hyponatremia  7. CKD stage III  FULL CODE     -admit for further care   -sepsis protocol initiated, sputum cultures ordered   -Urine legionella and strep   -duonebs prn, mucinex prn. Rocephin and Azithromycin   -supplemental O2 as needed   -gentle hydration if needed   -monitor Na and Renal function at this time   -supportive care   -if doesn't improve with above measure- obtain CTA chest to rule out PE- although low in suspicion at this time. Estimated length of stay : 2-3 days     CC: SOb       HPI:     Marilia Montes is a 80 y.o. female who comes to [facility] ed with complaints of sob. Patient has been feeling sob for the past 4 days along with URI type of symptoms.  states when her symptoms started he went to the [facility] and got antitussive medications to help her cough but did not seem to help much. Over the course of last 3 days her sob became progressively worst to a point where she couldn't even move around in her bed much without getting sob.  states they lives alone at home and denies any sick contacts. She did have some low grade fever at home but did not check her temp.  He thinks the patient should have came here 2 days ago but she was resisting at first but finally agreed to it today. In the ED she was noted to be significantly sob with hypoxia on RA sat in low 80s. She was given breathing tx, started on Abx and O2 supplement. She felt a little better after that. Denies any hx of smoking, alcohol and IVDA. Past Medical History:   Diagnosis Date    Aortic stenosis     Essential hypertension, benign     Hyperlipidemia     Hypertension     Hypoxia        Past Surgical History:   Procedure Laterality Date    HX APPENDECTOMY      Age 25    HX BREAST BIOPSY      benign breast biopsy left side    HX HIP REPLACEMENT  2008       Family History   Problem Relation Age of Onset    Heart Failure Mother 78       Social History     Social History    Marital status:      Spouse name: N/A    Number of children: N/A    Years of education: N/A     Social History Main Topics    Smoking status: Never Smoker    Smokeless tobacco: Never Used    Alcohol use No    Drug use: No    Sexual activity: Not Asked     Other Topics Concern    None     Social History Narrative       Prior to Admission medications    Medication Sig Start Date End Date Taking? Authorizing Provider   metoprolol (LOPRESSOR) 25 mg tablet Take 1 Tab by mouth two (2) times a day. 9/24/13   Celia Davenport MD   lisinopril (PRINIVIL, ZESTRIL) 20 mg tablet Take 20 mg by mouth daily. Edmund Arroyo MD   cholecalciferol, vitamin D3, (VITAMIN D3) 2,000 unit Tab Take  by mouth. Edmund Arroyo MD   acetaminophen (TYLENOL ARTHRITIS PAIN) 650 mg CR tablet Take 650 mg by mouth every six (6) hours as needed. Edmund Arroyo MD   raloxifene (EVISTA) 60 mg tablet Take  by mouth daily. Historical Provider   fenofibrate (TRICOR) 160 mg tablet Take 160 mg by mouth daily. Historical Provider   oxybutynin (DITROPAN) 5 mg tablet Take 2.5 mg by mouth daily.       Historical Provider   pravastatin (PRAVACHOL) 40 mg tablet Take 40 mg by mouth daily.      Historical Provider   calcium carbonate (OS-TYRA) 500 mg calcium (1,250 mg) tablet Take 500 mg by mouth daily. Historical Provider   vitamin a-vitamin c-vit e-min (OCUVITE) tablet Take 2 Tabs by mouth daily. Historical Provider   multivitamin (ONE A DAY) tablet Take 1 Tab by mouth daily. Historical Provider   cholecalciferol, vitamin D3, (VITAMIN D-3) 2,000 unit Tab Take  by mouth. Historical Provider   aspirin 81 mg tablet Take 81 mg by mouth. Historical Provider   zolpidem (AMBIEN) 5 mg tablet Take  by mouth nightly as needed. Historical Provider       No Known Allergies    Review of Systems  Gen: No fever, chills, malaise, weight loss/gain. Heent: No headache, rhinorrhea, epistaxis, ear pain, hearing loss, sinus pain, neck pain/stiffness, sore throat. Heart: No chest pain, palpitations, RAINES, pnd, or orthopnea. Resp: No hemoptysis, wheezing  GI: No nausea, vomiting, diarrhea, constipation, melena or hematochezia. : No urinary obstruction, dysuria or hematuria. Derm: No rash, new skin lesion or pruritis. Musc/skeletal: no bone or joint complains. Vasc: No edema, cyanosis or claudication. Endo: No heat/cold intolerance, no polyuria,polydipsia or polyphagia. Neuro: No unilateral weakness, numbness, tingling. No seizures. Heme: No easy bruising or bleeding. Physical Exam:     Physical Exam:  Visit Vitals    /87    Pulse 82    Temp 98.4 °F (36.9 °C)    Resp 24    Ht 5' (1.524 m)    Wt 51.3 kg (113 lb)    SpO2 95%    BMI 22.07 kg/m2    O2 Flow Rate (L/min): 2.5 l/min O2 Device: Nasal cannula    Temp (24hrs), Av.9 °F (37.7 °C), Min:98.4 °F (36.9 °C), Max:100.9 °F (38.3 °C)             General:  Awake, cooperative, mild distress due to sob but improved. Head:  Normocephalic, without obvious abnormality, atraumatic. Eyes:  Conjunctivae/corneas clear, sclera anicteric, PERRL, EOMs intact.    Nose: Nares normal. No drainage or sinus tenderness. Throat: Lips, mucosa, and tongue normal.    Neck: Supple, symmetrical, trachea midline, no adenopathy. Lungs:   Diffuse exp coarse BS       Heart:  Regular rate and rhythm, S1, S2 normal, no murmur, click, rub or gallop. Abdomen: Soft, non-tender. Bowel sounds normal. No masses,  No organomegaly. Extremities: Extremities normal, atraumatic, no cyanosis or edema. Capillary refill normal.   Pulses: 2+ and symmetric all extremities. Skin: Skin color pink/pale/mottled/flushed, turgor normal. No rashes or lesions   Neurologic: CNII-XII intact. No focal motor or sensory deficit. Labs Reviewed:      Recent Results (from the past 24 hour(s))   EKG, 12 LEAD, INITIAL    Collection Time: 03/18/18 11:32 PM   Result Value Ref Range    Ventricular Rate 72 BPM    Atrial Rate 72 BPM    P-R Interval 152 ms    QRS Duration 72 ms    Q-T Interval 368 ms    QTC Calculation (Bezet) 402 ms    Calculated P Axis 45 degrees    Calculated R Axis -35 degrees    Calculated T Axis 35 degrees    Diagnosis       Normal sinus rhythm  Left axis deviation  Abnormal ECG  When compared with ECG of 22-SEP-2013 13:31,  Vent. rate has decreased BY  38 BPM  Nonspecific T wave abnormality no longer evident in Lateral leads     LACTIC ACID    Collection Time: 03/18/18 11:42 PM   Result Value Ref Range    Lactic acid 1.0 0.4 - 2.0 MMOL/L   POC G3    Collection Time: 03/18/18 11:44 PM   Result Value Ref Range    Device: NASAL CANNULA      Flow rate (POC) 2.5 L/M    pH (POC) 7.342 (L) 7.35 - 7.45      pCO2 (POC) 67.8 (H) 35.0 - 45.0 MMHG    pO2 (POC) 104 (H) 80 - 100 MMHG    HCO3 (POC) 36.3 (H) 22 - 26 MMOL/L    sO2 (POC) 97 92 - 97 %    Base excess (POC) 11 mmol/L    Allens test (POC) YES      Total resp.  rate 20      Site LEFT RADIAL      Patient temp. 100.5      Specimen type (POC) ARTERIAL      Performed by Keith Fournier    CBC WITH AUTOMATED DIFF    Collection Time: 03/18/18 11:45 PM   Result Value Ref Range    WBC 10.6 4.6 - 13.2 K/uL    RBC 4.23 4.20 - 5.30 M/uL    HGB 13.3 12.0 - 16.0 g/dL    HCT 41.2 35.0 - 45.0 %    MCV 97.4 (H) 74.0 - 97.0 FL    MCH 31.4 24.0 - 34.0 PG    MCHC 32.3 31.0 - 37.0 g/dL    RDW 13.3 11.6 - 14.5 %    PLATELET 824 668 - 762 K/uL    MPV 10.0 9.2 - 11.8 FL    NEUTROPHILS 87 (H) 40 - 73 %    LYMPHOCYTES 7 (L) 21 - 52 %    MONOCYTES 6 3 - 10 %    EOSINOPHILS 0 0 - 5 %    BASOPHILS 0 0 - 2 %    ABS. NEUTROPHILS 9.1 (H) 1.8 - 8.0 K/UL    ABS. LYMPHOCYTES 0.8 (L) 0.9 - 3.6 K/UL    ABS. MONOCYTES 0.7 0.05 - 1.2 K/UL    ABS. EOSINOPHILS 0.0 0.0 - 0.4 K/UL    ABS. BASOPHILS 0.0 0.0 - 0.06 K/UL    DF AUTOMATED     METABOLIC PANEL, COMPREHENSIVE    Collection Time: 03/18/18 11:45 PM   Result Value Ref Range    Sodium 133 (L) 136 - 145 mmol/L    Potassium 4.1 3.5 - 5.5 mmol/L    Chloride 93 (L) 100 - 108 mmol/L    CO2 32 21 - 32 mmol/L    Anion gap 8 3.0 - 18 mmol/L    Glucose 151 (H) 74 - 99 mg/dL    BUN 25 (H) 7.0 - 18 MG/DL    Creatinine 1.14 0.6 - 1.3 MG/DL    BUN/Creatinine ratio 22 (H) 12 - 20      GFR est AA 55 (L) >60 ml/min/1.73m2    GFR est non-AA 45 (L) >60 ml/min/1.73m2    Calcium 9.0 8.5 - 10.1 MG/DL    Bilirubin, total 0.5 0.2 - 1.0 MG/DL    ALT (SGPT) 27 13 - 56 U/L    AST (SGOT) 46 (H) 15 - 37 U/L    Alk.  phosphatase 47 45 - 117 U/L    Protein, total 7.9 6.4 - 8.2 g/dL    Albumin 3.5 3.4 - 5.0 g/dL    Globulin 4.4 (H) 2.0 - 4.0 g/dL    A-G Ratio 0.8 0.8 - 1.7     INFLUENZA A & B AG (RAPID TEST)    Collection Time: 03/18/18 11:45 PM   Result Value Ref Range    Influenza A Antigen NEGATIVE  NEG      Influenza B Antigen NEGATIVE  NEG     URINALYSIS W/ RFLX MICROSCOPIC    Collection Time: 03/19/18 12:17 AM   Result Value Ref Range    Color YELLOW      Appearance CLOUDY      Specific gravity 1.019 1.005 - 1.030      pH (UA) 5.5 5.0 - 8.0      Protein 300 (A) NEG mg/dL    Glucose NEGATIVE  NEG mg/dL    Ketone NEGATIVE  NEG mg/dL    Bilirubin NEGATIVE  NEG      Blood MODERATE (A) NEG      Urobilinogen 0.2 0.2 - 1.0 EU/dL    Nitrites NEGATIVE  NEG      Leukocyte Esterase TRACE (A) NEG     URINE MICROSCOPIC ONLY    Collection Time: 03/19/18 12:17 AM   Result Value Ref Range    WBC 10 to 15 0 - 5 /hpf    RBC 2 to 5 0 - 5 /hpf    Epithelial cells FEW 0 - 5 /lpf    Bacteria 4+ (A) NEG /hpf    Mucus NEGATIVE  NEG /lpf       Procedures/imaging: see electronic medical records for all procedures/Xrays and details which were not copied into this note but were reviewed prior to creation of Plan    50 minutes of care time spent in the direct evaluation and treatment of this high risk patient.      CC: Philly Daniel MD

## 2018-03-19 NOTE — PROGRESS NOTES
0121: Pt. Arrived to unit via stretcher; Telemetry monitor attached and rhythm verified as NSR; Vital signs obtain, RR 24; No C/o pain or discomfort; Audible crackles/rales present, on auscultation rhonchi note throughout bilateral lungs fields; Admission database completed with spouse; Immunization hx updated; Unable to verify PTA med list,  with bring medlist later in AM; Pt. Oriented to room and call bell system; Opportunity for questions provided    0600: Shift Summary: Uneventful Shift; Pt. Rested quietly without complaint    0730 : Bedside shift change report given to DAI Box (oncoming nurse) by Ana Ayers (offgoing nurse). Report included the following information SBAR and Kardex.

## 2018-03-19 NOTE — CDMP QUERY
Please clarify if this patient is being treated/managed for:    => Acute hypercarbic and hypoxic Respiratory Failure as evidenced by ABG's and RR  =>Other Explanation of clinical findings  =>Unable to Determine (no explanation of clinical findings)    The medical record reflects the following:    Risk: HTN, aortic stenosis, CKD    Clinical Indicators: 79yo female presented w/ worsening SOB and found to have CAP. Initial O2 sats on RA =78%, RR 28. ER exam indicated the patient was \"Tachypnic with increased work of breathing and coarse breath sounds in all lung fields\" ABG's  =7.342/67.8/104/36.3/97% O2 @ 2.5L/NC    Treatment: O2 @ 2.5L IV zithromax, rocephin, duonebs, sputum cx, CT chest    Hypercapnic respiratory failure is characterized by a PaCO2 higher than 50 mmHg (or 10 mmHG above COPD patients baseline). Acute Respiratory Failure indicators include:   - Respirations <12 or >25   - Air hunger   - Use of accessory muscles of respiration   - Inability to speak in full sentences   - Cyanosis     AND    - Pulse ox <90% RA or <95% on O2   - pH <7.35 or >7.45   - pO2 < 60 mm Hg (or 10mm below COPD patient's baseline)   - pCO2 >50mm Hg (or 10mm above COPD patient's baseline)     Please clarify and document your clinical opinion in the progress notes and discharge summary including the definitive and/or presumptive diagnosis, (suspected or probable), related to the above clinical findings. Please include clinical findings supporting your diagnosis    Please clarify and document your clinical opinion in the progress notes and discharge summary including the definitive and/or presumptive diagnosis, (suspected or probable), related to the above clinical findings. Please include clinical findings supporting your diagnosis. If you DECLINE this query or would like to communicate with [facility], please utilize the \"[facility] message box\" at the TOP of the Progress Note on the right.       Thank you,  Berta Hodge RN, CDMP [phone]

## 2018-03-19 NOTE — ROUTINE PROCESS
TRANSFER - IN REPORT:    Verbal report received from BELKYS Rodriguez(name) on June D Maday Arms  being received from ED(unit) for routine progression of care      Report consisted of patients Situation, Background, Assessment and   Recommendations(SBAR). Information from the following report(s) SBAR and Kardex was reviewed with the receiving nurse. Opportunity for questions and clarification was provided. Assessment completed upon patients arrival to unit and care assumed. Primary Nurse Tawnya Piña RN and PABLO Gilman RN performed a dual skin assessment on this patient No impairment noted  Connor score is 19

## 2018-03-20 PROBLEM — I25.10 ATHEROSCLEROSIS OF NATIVE CORONARY ARTERY OF NATIVE HEART WITHOUT ANGINA PECTORIS: Status: ACTIVE | Noted: 2017-01-01

## 2018-03-20 PROBLEM — Z91.81 AT HIGH RISK FOR FALLS: Status: ACTIVE | Noted: 2017-01-01

## 2018-03-20 PROBLEM — I27.20 PULMONARY HYPERTENSION (HCC): Status: ACTIVE | Noted: 2017-01-01

## 2018-03-20 PROBLEM — R06.03 ACUTE RESPIRATORY DISTRESS: Status: RESOLVED | Noted: 2018-01-01 | Resolved: 2018-01-01

## 2018-03-20 PROBLEM — H35.3132 NONEXUDATIVE AGE-RELATED MACULAR DEGENERATION, BILATERAL, INTERMEDIATE DRY STAGE: Status: ACTIVE | Noted: 2017-01-01

## 2018-03-20 NOTE — PROGRESS NOTES
Hospitalist Progress Note    Patient: June D Lauren Martinez MRN: [id number]  CSN: [id number]    YOB: 1929  Age: 80 y.o. Sex: female    DOA: 3/18/2018 LOS:  LOS: 1 day          Chief Complaint:    Drowsiness    Assessment/Plan     1. Acute Respiratory Distress   2. CAP  3. Chronic Diastolic CHF  4. Moderate AS  5. HTN  6. Hyponatremia  7. CKD 3    1. Patient initially responded well to abx, fluids, supplemental O2. Was in process of weaning off of O2, only down to 2L/min from the 3L/min that she was admitted on. This AM appears more somnolent but arousable. VBG obtained, which showed pH of 7.268; pCO2 67.4; pO2 43. Lengthy discussion held with the patient's  at bedside in regards to aggressive measures moving forward. Discussed BiPAP, CPR to include chest compressions, \"shocks\", and intubation. The patient's  stated that he and his wife are firm believers in the [place] and if he wanted to heal her, he could, and if it's her time to go, then he knows she would prefer a natural departure. The  would just like to make the patient comfortable. DNR signed by the patient's , code status updated, discussed with palliative medicine team.   2. Continue abx until final decision is made about transition to comfort care measures. DVT Prophylaxis - Heparin  Dispo: Transitioning to comfort care, pending palliative medicine meeting.      CDMP - Acute respiratory failure    Patient Active Problem List   Diagnosis Code    Essential hypertension, benign I10    Aortic stenosis- moderate 2017 I35.0    Paroxysmal a-fib ([facility]) I48.0    Chronic diastolic CHF (congestive heart failure) ([facility]) I50.32    Hypoxia R09.02    CAP (community acquired pneumonia) J18.9    Hyponatremia E87.1    Acute respiratory disease J06.9    Atherosclerosis of native coronary artery of native heart without angina pectoris I25.10    At high risk for falls Z91.81    Hypercholesterolemia E78.00    Hypertension I10  Nonexudative age-related macular degeneration, bilateral, intermediate dry stage H35.3132    Pulmonary hypertension I27.20       Subjective:    Does not participate. Review of systems:    Unable to assess. Vital signs/Intake and Output:  Visit Vitals    /53    Pulse 87    Temp 98.7 °F (37.1 °C)    Resp 18    Ht 5' (1.524 m)    Wt 52.5 kg (115 lb 11.9 oz)    SpO2 93%    BMI 22.6 kg/m2     Current Shift:     Last three shifts:  03/18 1901 - 03/20 0700  In: 943.8 [P.O.:300; I.V.:643.8]  Out: 400 [Urine:400]    Exam:    General: Elderly female, somnolent, NAD, OX3  Head/Neck: NCAT, supple, No masses, No lymphadenopathy  CVS:Regular rate and rhythm, no M/R/G, S1/S2 heard, no thrill  Lungs:Course lung sounds bilaterally, end expiratory wheezing bilaterally, rhonchi present throughout. Abdomen: Soft, Nontender, No distention, Normal Bowel sounds, No hepatomegaly  Extremities: No C/C/E, pulses palpable 2+  Skin:normal texture and turgor, no rashes, no lesions  Neuro:Somnolent but arousable. Does not follow commands. Psych:somonlent. Labs: Results:       Chemistry Recent Labs      03/20/18   0218  03/18/18   2345   GLU  144*  151*   NA  137  133*   K  3.7  4.1   CL  100  93*   CO2  30  32   BUN  21*  25*   CREA  0.85  1.14   CA  8.6  9.0   AGAP  7  8   BUCR  25*  22*   AP   --   47   TP   --   7.9   ALB   --   3.5   GLOB   --   4.4*   AGRAT   --   0.8      CBC w/Diff Recent Labs      03/20/18   0218  03/18/18   2345   WBC  12.5  10.6   RBC  4.00*  4.23   HGB  12.7  13.3   HCT  40.3  41.2   PLT  181  203   GRANS   --   87*   LYMPH   --   7*   EOS   --   0      Cardiac Enzymes No results for input(s): CPK, CKND1, RADHA in the last 72 hours. No lab exists for component: CKRMB, TROIP   Coagulation No results for input(s): PTP, INR, APTT in the last 72 hours.     No lab exists for component: INREXT    Lipid Panel No results found for: CHOL, CHOLPOCT, CHOLX, CHLST, CHOLV, [id number], HDL, LDL, LDLC, DLDLP, 409661, VLDLC, VLDL, TGLX, TRIGL, TRIGP, TGLPOCT, CHHD, CHHDX   BNP No results for input(s): BNPP in the last 72 hours.    Liver Enzymes Recent Labs      03/18/18   2345   TP  7.9   ALB  3.5   AP  47   SGOT  46*      Thyroid Studies Lab Results   Component Value Date/Time    TSH 2.89 09/23/2013 09:35 AM        Procedures/imaging: see electronic medical records for all procedures/Xrays and details which were not copied into this note but were reviewed prior to creation of [address], PA-C

## 2018-03-20 NOTE — ACP (ADVANCE CARE PLANNING)
Family states patient has Advance Directive. Copy requested for chart. They state MPOA is , Venson Harada [phone].  signed DDNR as wife is too ill. Copy placed on chart. Original returned to Mr. Irma Kincaid. Family wants all aggressive treatment to stop and focus only on comfort care. They want all lines and monitors removed. They do not want resuscitation, intubation or any artificial life support including BIPAP or tube feedings.

## 2018-03-20 NOTE — PROGRESS NOTES
Venous sample was obtained while drawing ABG.  Doctor notified and is okay with venous sample at this time

## 2018-03-20 NOTE — CONSULTS
[facility]: [phone] ([phone])  [facility]: [phone]   [facility]: [phone]    Patient Name: Marilia Villegas  YOB: 1929    Date of Initial Consult: 3/20/18  Reason for Consult: symptom mgmt, care decisions  Requesting Provider: Gisselle DIMAS/Dr. Felix Harley   Primary Care Physician: Mundo Cooper MD      SUMMARY:   Marilia Villegas is a 80y.o. year old with a past history of PAF, HTN, CAD, moderate AS, and pulmonary HTN admitted with RLL pneumonia.  relates a gradual functional decline with increased frailty and SOB over last few months. His not responded to treatment of CAP per protocol and this am progressed to hypercapnic respiratory failure. No longer interacting consistently. PALLIATIVE DIAGNOSES:     Patient Active Problem List   Diagnosis Code    Essential hypertension, benign I10    Aortic stenosis- moderate 2017 I35.0    Paroxysmal a-fib ([facility]) I48.0    Chronic diastolic CHF (congestive heart failure) ([facility]) I50.32    Hypoxia R09.02    CAP (community acquired pneumonia) J18.9    Hyponatremia E87.1    Acute respiratory disease J06.9    Atherosclerosis of native coronary artery of native heart without angina pectoris I25.10    At high risk for falls Z91.81    Hypercholesterolemia E78.00    Hypertension I10    Nonexudative age-related macular degeneration, bilateral, intermediate dry stage H35.3132    Pulmonary hypertension I27.20   1. 2. PLAN:   1. Met with patient's  of 46 yrs, three daughters and a LEROY. Mr. Sydnie Abernathy indicates his wife has executed an advance directive and would not want any further aggressive measures such as BIPAP. Viewing her decline over the last few months he believes she is ready to go. With this in mind he and the family ask to stop all aggressive measures including abx and focus purely on his wife's comfort and symptom management.  Explained elements of comfort care plan and uncertainty of the time course once we stop curative treatment. Questions answered. Comfort orders placed. 2. Initial consult note routed to primary continuity provider  3. Communicated plan of care with: Palliative IDT    GOALS OF CARE:  Patient/Health Care Proxy Stated Goals: Comfort      TREATMENT PREFERENCES:   Code Status: DNR    Advance Care Planning:  Advance Care Planning 3/19/2018   Patient's Healthcare Decision Maker is: Verbal statement (Legal Next of Kin remains as decision maker)   Primary Decision Maker Name David Segovia   Primary Decision Maker Phone Number [phone]   Primary Decision Maker Relationship to Patient Spouse   Confirm Advance Directive None   Patient Would Like to Complete Advance Directive No       Medical Interventions: Comfort measures   Other Instructions:   Artificially Administered Nutrition: No feeding tube     Other:  As far as possible, the palliative care team has discussed with patient / health care proxy about goals of care / treatment preferences for patient. HISTORY:     History obtained from: family    CHIEF COMPLAINT: see summary    HPI/SUBJECTIVE:    The patient is:   [] Verbal and participatory  [x] Non-participatory due to:   obtunded     Clinical Pain Assessment (nonverbal scale for nonverbal patients):            Duration: for how long has pt been experiencing pain (e.g., 2 days, 1 month, years)  Frequency: how often pain is an issue (e.g., several times per day, once every few days, constant)     FUNCTIONAL ASSESSMENT:     Palliative Performance Scale (PPS):  PPS: 20    ECOG        PSYCHOSOCIAL/SPIRITUAL SCREENING:      Any spiritual / Anabaptism concerns:  [] Yes /  [x] No    Caregiver Burnout:  [] Yes /  [x] No /  [] No Caregiver Present      Anticipatory grief assessment:   [x] Normal  / [] Maladaptive        REVIEW OF SYSTEMS:     Positive and pertinent negative findings in ROS are noted above in HPI.   The following systems were [] reviewed / [x] unable to be reviewed as noted in HPI  Other findings are noted below. Systems: constitutional, ears/nose/mouth/throat, respiratory, gastrointestinal, genitourinary, musculoskeletal, integumentary, neurologic, psychiatric, endocrine. Positive findings noted below. Modified ESAS Completed by: provider                                   Stool Occurrence(s): 1        PHYSICAL EXAM:     Wt Readings from Last 3 Encounters:   03/20/18 52.5 kg (115 lb 11.9 oz)   08/06/14 57.2 kg (126 lb)   09/23/13 60.3 kg (133 lb)     Blood pressure 141/53, pulse 87, temperature 98.7 °F (37.1 °C), resp. rate 18, height 5' (1.524 m), weight 52.5 kg (115 lb 11.9 oz), SpO2 93 %. Pain:  Pain Scale 1: Numeric (0 - 10)  Pain Intensity 1: 0                 Last bowel movement:     Constitutional: slight tachypnea.  Opens eyes but doesn't   Eyes: pupils equal, anicteric  ENMT: no nasal discharge, moist mucous membranes  Cardiovascular: regular rhythm, distal pulses intact  Respiratory: breathing slightly labored, symmetric  Gastrointestinal: soft non-tender, +bowel sounds  Musculoskeletal: no deformity, no tenderness to palpation  Skin: warm, dry  Neurologic: not following commands, moving all extremities  Psychiatric: full affect, no hallucinations  Other:       HISTORY:     Active Problems:    Essential hypertension, benign ()      Aortic stenosis- moderate 2017 ()      Paroxysmal a-fib (HCC) (9/22/2013)      Chronic diastolic CHF (congestive heart failure) ([facility] [address] (9/24/2013)      Hypoxia (3/19/2018)      CAP (community acquired pneumonia) (3/19/2018)      Hyponatremia (3/19/2018)      Acute respiratory disease (3/19/2018)      Pulmonary hypertension (10/24/2017)      Past Medical History:   Diagnosis Date    Aortic stenosis     Essential hypertension, benign     Hyperlipidemia     Hypertension     Hypoxia       Past Surgical History:   Procedure Laterality Date    HX APPENDECTOMY      Age 25    HX BREAST BIOPSY      benign breast biopsy left side    HX HIP REPLACEMENT  2008      Family History   Problem Relation Age of Onset    Heart Failure Mother 78     History reviewed, no pertinent family history. Social History   Substance Use Topics    Smoking status: Never Smoker    Smokeless tobacco: Never Used    Alcohol use No     No Known Allergies   Current Facility-Administered Medications   Medication Dose Route Frequency    albuterol-ipratropium (DUO-NEB) 2.5 MG-0.5 MG/3 ML  3 mL Nebulization Q6H PRN    haloperidol (HALDOL) 2 mg/mL oral solution 2 mg  2 mg SubLINGual Q6H PRN    Or    haloperidol lactate (HALDOL) injection 2 mg  2 mg IntraVENous Q6H PRN    LORazepam (INTENSOL) 2 mg/mL oral concentrate 1 mg  1 mg Oral Q1H PRN    acetaminophen (TYLENOL) tablet 650 mg  650 mg Oral Q4H PRN    Or    acetaminophen (TYLENOL) solution 650 mg  650 mg Oral Q4H PRN    Or    acetaminophen (TYLENOL) suppository 650 mg  650 mg Rectal Q4H PRN    morphine (ROXANOL) 100 mg/5 mL (20 mg/mL) concentrated solution 10 mg  10 mg Oral Q1H PRN    morphine injection 2 mg  2 mg IntraVENous Q15MIN PRN    sodium chloride (NS) flush 5-10 mL  5-10 mL IntraVENous PRN        LAB AND IMAGING FINDINGS:     Lab Results   Component Value Date/Time    WBC 12.5 03/20/2018 02:18 AM    HGB 12.7 03/20/2018 02:18 AM    PLATELET 983 87/15/4321 02:18 AM     Lab Results   Component Value Date/Time    Sodium 137 03/20/2018 02:18 AM    Potassium 3.7 03/20/2018 02:18 AM    Chloride 100 03/20/2018 02:18 AM    CO2 30 03/20/2018 02:18 AM    BUN 21 (H) 03/20/2018 02:18 AM    Creatinine 0.85 03/20/2018 02:18 AM    Calcium 8.6 03/20/2018 02:18 AM      Lab Results   Component Value Date/Time    AST (SGOT) 46 (H) 03/18/2018 11:45 PM    Alk.  phosphatase 47 03/18/2018 11:45 PM    Protein, total 7.9 03/18/2018 11:45 PM    Albumin 3.5 03/18/2018 11:45 PM    Globulin 4.4 (H) 03/18/2018 11:45 PM     Lab Results   Component Value Date/Time    INR 1.1 09/22/2013 01:38 PM    Prothrombin time 13.7 09/22/2013 01:38 PM      No results found for: IRON, FE, TIBC, IBCT, PSAT, FERR   No results found for: PH, PCO2, PO2  No components found for: [place]   Lab Results   Component Value Date/Time    CK 60 09/22/2013 07:25 PM    CK - MB 1.6 09/22/2013 07:25 PM              Total time: 50 min  Counseling / coordination time, spent as noted above: 45  > 50% counseling / coordination        Pushpa Ritter MD  Palliative Medicine

## 2018-03-20 NOTE — ROUTINE PROCESS
Bedside shift change report given to Ramona Merlos RN (oncoming nurse) by Author Fabian RN (offgoing nurse). Report included the following information SBAR, Kardex and MAR.

## 2018-03-20 NOTE — PROGRESS NOTES
Discussed with , he does not want bipap and he said she is ready to [place] taking the course. She is 79 y/o and dnr/i was granted, case discussed with palliative care.

## 2018-03-20 NOTE — PROGRESS NOTES
Problem: Falls - Risk of  Goal: *Absence of Falls  Document Magda Fall Risk and appropriate interventions in the flowsheet.    Outcome: Progressing Towards Goal  Fall Risk Interventions:  Mobility Interventions: Patient to call before getting OOB    Mentation Interventions: Bed/chair exit alarm, More frequent rounding, Reorient patient    Medication Interventions: Patient to call before getting OOB    Elimination Interventions: Bed/chair exit alarm, Call light in reach, Toilet paper/wipes in reach

## 2018-03-20 NOTE — PROGRESS NOTES
Occupational Therapy Evaluation/Treatment Attempt    Chart reviewed. Attempted Occupational Therapy Evaluation/Treatment, however, patient unable to be seen due to:  []  Nausea/vomiting  []  Eating  []  Pain  []  Patient too lethargic  []  Off Unit for testing/procedure  []  Dialysis treatment in progress   []  Telemetry Results  [x]  Other: Pt going Comfort Care    Will f/u later as patient's schedule allows.    Thank you for this referral.  Rachel Polo, OTR/L

## 2018-03-20 NOTE — ROUTINE PROCESS
Bedside and Verbal shift change report given to LUCAS Trotter (oncoming nurse) by DAI Corral (offgoing nurse). Report included the following information SBAR, Kardex, MAR and Recent Results.

## 2018-03-20 NOTE — PROGRESS NOTES
Assumed care of pt. Pt is alert but drowsy. 0915:Pt took medication but refused to eat breakfast. Night RN stated pt was trying to get out of bed all night. Jose assessed while this RN was in room pt stated pt was not this drowsy yesterday. Ordered stat ABG. Pt placed on comfort care per family request. Pt has pain medication if needed for comfort.   Pt had uneventful shift

## 2018-03-21 NOTE — ROUTINE PROCESS
Bedside shift change report given to Brian Cotton RN (oncoming nurse) by Maranda Hare RN (offgoing nurse). Report included the following information SBAR, Kardex and MAR.

## 2018-03-21 NOTE — PROGRESS NOTES
Assumed care of pt. Pt spent day with family. Pt has been comfortable during this shift. Pt had uneventful.

## 2018-03-21 NOTE — PROGRESS NOTES
Problem: Falls - Risk of  Goal: *Absence of Falls  Document Magda Fall Risk and appropriate interventions in the flowsheet. Outcome: Progressing Towards Goal  Fall Risk Interventions:  Mobility Interventions: Bed/chair exit alarm    Mentation Interventions: Bed/chair exit alarm, Family/sitter at bedside    Medication Interventions: Bed/chair exit alarm    Elimination Interventions:  Toileting schedule/hourly rounds

## 2018-03-21 NOTE — PROGRESS NOTES
Daughter Imelda Osei is known to me. She asked to see me. She and her two sisters and their father were at the bedside. (Step)mother has moved to comfort care. Family is very satisfied with the care she is receiving. Patient and wife are active at [facility]. They are receiving visits from [employer] members. [address] [facility], [place].   Board Certified   [phone] - Office

## 2018-03-21 NOTE — ROUTINE PROCESS
Bedside and Verbal shift change report given to 1726 Shawn William (oncoming nurse) by Quentin Nava RN (offgoing nurse). Report included the following information SBAR and Kardex.

## 2018-03-21 NOTE — ROUTINE PROCESS
0100 am-0520am The documentation for this period is being entered following the guidelines as defined in the [facility] downtime policy by Rosaura Ballard RN.

## 2018-03-21 NOTE — PROGRESS NOTES
Palliative Medicine Progress Note  [facility]: [phone] ([phone])  [facility]: [phone]   [facility]: [phone]    Patient Name: Marilia Vang  YOB: 1929    Date of Initial Consult: 3/20/18  Reason for Consult: symptom mgmt, care decisions  Requesting Provider: Morris DIMAS/Dr. Vanessa High   Primary Care Physician: Coretta Hernandez MD      SUMMARY:   Marilia Vang is a 80y.o. year old with a past history of PAF, HTN, CAD, moderate AS, and pulmonary HTN admitted with RLL pneumonia.  relates a gradual functional decline with increased frailty and SOB over last few months. His not responded to treatment of CAP per protocol and this am progressed to hypercapnic respiratory failure. No longer interacting consistently. 3/21/18: resting comfortably. Daughter says she rouses with mouth care. Three doses morphine overnight. PALLIATIVE DIAGNOSES:     Patient Active Problem List   Diagnosis Code    Essential hypertension, benign I10    Aortic stenosis- moderate 2017 I35.0    Paroxysmal a-fib ([facility]) I48.0    Chronic diastolic CHF (congestive heart failure) ([facility]) I50.32    Hypoxia R09.02    CAP (community acquired pneumonia) J18.9    Hyponatremia E87.1    Acute respiratory disease J06.9    Atherosclerosis of native coronary artery of native heart without angina pectoris I25.10    At high risk for falls Z91.81    Hypercholesterolemia E78.00    Hypertension I10    Nonexudative age-related macular degeneration, bilateral, intermediate dry stage H35.3132    Pulmonary hypertension I27.20     1. PLAN:   1. Met with patient's  of 46 yrs, three daughters and a LEROY. Mr. Lowell Baumgarten indicates his wife has executed an advance directive and would not want any further aggressive measures such as BIPAP. Viewing her decline over the last few months he believes she is ready to go.  With this in mind he and the family ask to stop all aggressive measures including abx and focus purely on his wife's comfort and symptom management. Explained elements of comfort care plan and uncertainty of the time course once we stop curative treatment. Questions answered. Comfort orders placed. 3/21/18: If remains comfortable and hemodynamically stable can suggest home with hospice. 2. Initial consult note routed to primary continuity provider  3. Communicated plan of care with: Palliative IDT    GOALS OF CARE:  Patient/Health Care Proxy Stated Goals: Comfort      TREATMENT PREFERENCES:   Code Status: DNR    Advance Care Planning:  Advance Care Planning 3/20/2018   Patient's Healthcare Decision Maker is: Legal Next of Jorge 69   Primary Decision Maker Name Matthew Nguyen   Primary Decision Maker Phone Number [phone]   Primary Decision Maker Relationship to Patient Spouse   Confirm Advance Directive Yes, not on file   Patient Would Like to Complete Advance Directive Unable   Does the patient have other document types Do Not Resuscitate       Medical Interventions: Comfort measures   Other Instructions:   Artificially Administered Nutrition: No feeding tube     Other:  As far as possible, the palliative care team has discussed with patient / health care proxy about goals of care / treatment preferences for patient. HISTORY:     History obtained from: family    CHIEF COMPLAINT: see summary    HPI/SUBJECTIVE:    The patient is:   [] Verbal and participatory  [x] Non-participatory due to:   obtunded     Clinical Pain Assessment (nonverbal scale for nonverbal patients):        Activity (Movement): Lying quietly, normal position    Duration: for how long has pt been experiencing pain (e.g., 2 days, 1 month, years)  Frequency: how often pain is an issue (e.g., several times per day, once every few days, constant)     FUNCTIONAL ASSESSMENT:     Palliative Performance Scale (PPS):  PPS: 20    ECOG        PSYCHOSOCIAL/SPIRITUAL SCREENING:      Any spiritual / Moravian concerns:  [] Yes /  [x] No    Caregiver Burnout:  [] Yes /  [x] No /  [] No Caregiver Present      Anticipatory grief assessment:   [x] Normal  / [] Maladaptive        REVIEW OF SYSTEMS:     Positive and pertinent negative findings in ROS are noted above in HPI. The following systems were [] reviewed / [x] unable to be reviewed as noted in HPI  Other findings are noted below. Systems: constitutional, ears/nose/mouth/throat, respiratory, gastrointestinal, genitourinary, musculoskeletal, integumentary, neurologic, psychiatric, endocrine. Positive findings noted below. Modified ESAS Completed by: provider                                   Stool Occurrence(s): 1        PHYSICAL EXAM:     Wt Readings from Last 3 Encounters:   03/20/18 52.5 kg (115 lb 11.9 oz)   08/06/14 57.2 kg (126 lb)   09/23/13 60.3 kg (133 lb)     Blood pressure 109/62, pulse (!) 106, temperature 98 °F (36.7 °C), resp. rate 18, height 5' (1.524 m), weight 52.5 kg (115 lb 11.9 oz), SpO2 97 %. Pain:  Pain Scale 1: Adult Nonverbal Pain Scale  Pain Intensity 1: 0     Pain Location 1:  (generalized)           Last bowel movement:     Constitutional: resting comfortably.    Eyes: pupils equal, anicteric  ENMT: no nasal discharge, moist mucous membranes  Cardiovascular: regular rhythm, distal pulses intact  Respiratory: breathing slightly labored, symmetric  Gastrointestinal: soft non-tender, +bowel sounds  Musculoskeletal: no deformity, no tenderness to palpation  Skin: warm, dry  Neurologic: not following commands,   Psychiatric:   Other:       HISTORY:     Active Problems:    Essential hypertension, benign ()      Aortic stenosis- moderate 2017 ()      Paroxysmal a-fib (HCC) (9/22/2013)      Chronic diastolic CHF (congestive heart failure) ([facility] [address] (9/24/2013)      Hypoxia (3/19/2018)      CAP (community acquired pneumonia) (3/19/2018)      Hyponatremia (3/19/2018)      Acute respiratory disease (3/19/2018)      Pulmonary hypertension (10/24/2017)      Past Medical History:   Diagnosis Date    Aortic stenosis     Essential hypertension, benign     Hyperlipidemia     Hypertension     Hypoxia       Past Surgical History:   Procedure Laterality Date    HX APPENDECTOMY      Age 25    HX BREAST BIOPSY      benign breast biopsy left side    HX HIP REPLACEMENT  2008      Family History   Problem Relation Age of Onset    Heart Failure Mother 78     History reviewed, no pertinent family history. Social History   Substance Use Topics    Smoking status: Never Smoker    Smokeless tobacco: Never Used    Alcohol use No     No Known Allergies   Current Facility-Administered Medications   Medication Dose Route Frequency    albuterol-ipratropium (DUO-NEB) 2.5 MG-0.5 MG/3 ML  3 mL Nebulization Q6H PRN    haloperidol (HALDOL) 2 mg/mL oral solution 2 mg  2 mg SubLINGual Q6H PRN    Or    haloperidol lactate (HALDOL) injection 2 mg  2 mg IntraVENous Q6H PRN    LORazepam (INTENSOL) 2 mg/mL oral concentrate 1 mg  1 mg Oral Q1H PRN    acetaminophen (TYLENOL) tablet 650 mg  650 mg Oral Q4H PRN    Or    acetaminophen (TYLENOL) solution 650 mg  650 mg Oral Q4H PRN    Or    acetaminophen (TYLENOL) suppository 650 mg  650 mg Rectal Q4H PRN    morphine (ROXANOL) 100 mg/5 mL (20 mg/mL) concentrated solution 10 mg  10 mg Oral Q1H PRN    sodium chloride (NS) flush 5-10 mL  5-10 mL IntraVENous PRN        LAB AND IMAGING FINDINGS:     Lab Results   Component Value Date/Time    WBC 12.5 03/20/2018 02:18 AM    HGB 12.7 03/20/2018 02:18 AM    PLATELET 570 89/65/6326 02:18 AM     Lab Results   Component Value Date/Time    Sodium 137 03/20/2018 02:18 AM    Potassium 3.7 03/20/2018 02:18 AM    Chloride 100 03/20/2018 02:18 AM    CO2 30 03/20/2018 02:18 AM    BUN 21 (H) 03/20/2018 02:18 AM    Creatinine 0.85 03/20/2018 02:18 AM    Calcium 8.6 03/20/2018 02:18 AM      Lab Results   Component Value Date/Time    AST (SGOT) 46 (H) 03/18/2018 11:45 PM    Alk.  phosphatase 47 03/18/2018 11:45 PM    Protein, total 7.9 03/18/2018 11:45 PM    Albumin 3.5 03/18/2018 11:45 PM    Globulin 4.4 (H) 03/18/2018 11:45 PM     Lab Results   Component Value Date/Time    INR 1.1 09/22/2013 01:38 PM    Prothrombin time 13.7 09/22/2013 01:38 PM      No results found for: IRON, FE, TIBC, IBCT, PSAT, FERR   No results found for: PH, PCO2, PO2  No components found for: [place]   Lab Results   Component Value Date/Time    CK 60 09/22/2013 07:25 PM    CK - MB 1.6 09/22/2013 07:25 PM              Total time: 25min  Counseling / coordination time, spent as noted above: 20  > 50% counseling / coordination        Oscar James MD  Palliative Medicine

## 2018-03-21 NOTE — PROGRESS NOTES
Hospitalist Progress Note    Patient: Marilia Lerner MRN: [id number]  CSN: [id number]    YOB: 1929  Age: 80 y.o. Sex: female    DOA: 3/18/2018 LOS:  LOS: 2 days          Chief Complaint:    Comfort Care    Assessment/Plan     Patient placed on full comfort care measures yesterday afternoon. Discussion with the family and  -  does not feel he can provide adequate care at home at this time, will transfer rooms for this patient to provide more comfort. Continue comfort care measures. Patient is resting comfortably, no signs of distress. Respirations are unlabored and even. Patient Active Problem List   Diagnosis Code    Essential hypertension, benign I10    Aortic stenosis- moderate 2017 I35.0    Paroxysmal a-fib (HCC) I48.0    Chronic diastolic CHF (congestive heart failure) (HCC) I50.32    Hypoxia R09.02    CAP (community acquired pneumonia) J18.9    Hyponatremia E87.1    Acute respiratory disease J06.9    Atherosclerosis of native coronary artery of native heart without angina pectoris I25.10    At high risk for falls Z91.81    Hypercholesterolemia E78.00    Hypertension I10    Nonexudative age-related macular degeneration, bilateral, intermediate dry stage H35.3132    Pulmonary hypertension I27.20       Subjective:    Unable to obtain. Review of systems:    Unable to obtain.        Vital signs/Intake and Output:  Visit Vitals    /62    Pulse (!) 106    Temp 98 °F (36.7 °C)    Resp 18    Ht 5' (1.524 m)    Wt 52.5 kg (115 lb 11.9 oz)    SpO2 97%    BMI 22.6 kg/m2     Current Shift:     Last three shifts:       Exam:    General: Elderly female, obtunded, NAD  Head/Neck: NCAT, supple, No masses, No lymphadenopathy  CVS:Regular rate and rhythm, no M/R/G, S1/S2 heard, no thrill  Lungs:Diffuse rhonchi present  Abdomen: Soft, Nontender, No distention, Normal Bowel sounds, No hepatomegaly  Extremities: No C/C/E, pulses palpable 2+  Skin:normal texture and turgor, no rashes, no lesions  Neuro:obtunded  Psych:appropriate                Labs: Results:       Chemistry Recent Labs      03/20/18 0218 03/18/18   2345   GLU  144*  151*   NA  137  133*   K  3.7  4.1   CL  100  93*   CO2  30  32   BUN  21*  25*   CREA  0.85  1.14   CA  8.6  9.0   AGAP  7  8   BUCR  25*  22*   AP   --   47   TP   --   7.9   ALB   --   3.5   GLOB   --   4.4*   AGRAT   --   0.8      CBC w/Diff Recent Labs      03/20/18 0218 03/18/18   2345   WBC  12.5  10.6   RBC  4.00*  4.23   HGB  12.7  13.3   HCT  40.3  41.2   PLT  181  203   GRANS   --   87*   LYMPH   --   7*   EOS   --   0      Cardiac Enzymes No results for input(s): CPK, CKND1, RADHA in the last 72 hours. No lab exists for component: CKRMB, TROIP   Coagulation No results for input(s): PTP, INR, APTT in the last 72 hours. No lab exists for component: INREXT    Lipid Panel No results found for: CHOL, CHOLPOCT, CHOLX, CHLST, CHOLV, 338044, HDL, LDL, LDLC, DLDLP, 889992, VLDLC, VLDL, TGLX, TRIGL, TRIGP, TGLPOCT, CHHD, CHHDX   BNP No results for input(s): BNPP in the last 72 hours.    Liver Enzymes Recent Labs      03/18/18   2345   TP  7.9   ALB  3.5   AP  47   SGOT  46*      Thyroid Studies Lab Results   Component Value Date/Time    TSH 2.89 09/23/2013 09:35 AM        Procedures/imaging: see electronic medical records for all procedures/Xrays and details which were not copied into this note but were reviewed prior to creation of [address], [place]

## 2018-03-22 NOTE — PROGRESS NOTES
Bereavement Note:     responded to the death of Marilia D Rossy Aragon, who is a 80 y.o., female, offering Spiritual Care to patient and family, see flow sheets for interventions. Provided support to patient's daughters. The family is active at [facility], their Hossein Short has been to visit. Date of Death: 2018    Extended Emergency Contact Information  Primary Emergency Contact: Jaspreet Rodriguez  Address: [address]  [address] [employer] Phone: [phone]  Relation: Spouse                 YES      NO  UNKNOWN  Life Net   []        [x]    []   Eye Bank   [] [x] []   Medical Examiner  []        [x]  []   Going to [employer]  []        [] [x]      Autopsy   []        [x]         []   Sympathy Card  [x]        []  Bereavement Materials  [x]        []           Business Card Provided  [x]        []              Home: [address] [facility] [phone] [address] will continue to follow family and will provide spiritual care as needed. Rev.  [address] [facility]  [phone]

## 2018-03-22 NOTE — PROGRESS NOTES
0730 Assumed care of pt from Rebekah Gilbert. Pt resting quietly in bed, with  at bedside , no signs of distress, call bell within reach. 1167 Given prn Morphine for shortness of breath     0931 Given prn Morphine and Haldol for shortness of breath and agitation     1002 Pt pronounced as  by Dr Caprice Mann. Daughters at bedside, chaplain johnson. [address] called    Pt  while on our floor. Family at bedside.

## 2018-03-22 NOTE — PROGRESS NOTES
2030-Incontinent care provided, repositioned for comfort. 2200- Pt repositioned. 2245- Spouse/family Provided with recliner. 0630- Pt still lethargic, withdraws to pain.

## 2018-03-22 NOTE — ROUTINE PROCESS
Bedside and Verbal shift change report given to Rainer Bernal RN (oncoming nurse) by Shaye Spangler RN (offgoing nurse). Report included the following information SBAR and Kardex.

## 2018-03-22 NOTE — PROGRESS NOTES
Palliative Medicine Progress Note  [facility]: [phone] ([phone])  [facility]: [phone]   [facility]: [phone]    Patient Name: Marilia Perez  YOB: 1929    Date of Initial Consult: 3/20/18  Reason for Consult: symptom mgmt, care decisions  Requesting Provider: Alvaro DIMAS/Dr. Julia Salazar   Primary Care Physician: Ramila Bess MD      SUMMARY:   Marilia Perez is a 80y.o. year old with a past history of PAF, HTN, CAD, moderate AS, and pulmonary HTN admitted with RLL pneumonia.  relates a gradual functional decline with increased frailty and SOB over last few months. His not responded to treatment of CAP per protocol and this am progressed to hypercapnic respiratory failure. No longer interacting consistently. 3/21/18: resting comfortably. Daughter says she rouses with mouth care. Three doses morphine overnight.     3/22/18: Remains comfortable. BP down. Stepdaughters at bedside. PALLIATIVE DIAGNOSES:     Patient Active Problem List   Diagnosis Code    Essential hypertension, benign I10    Aortic stenosis- moderate 2017 I35.0    Paroxysmal a-fib ([facility]) I48.0    Chronic diastolic CHF (congestive heart failure) ([facility]) I50.32    Hypoxia R09.02    CAP (community acquired pneumonia) J18.9    Hyponatremia E87.1    Acute respiratory disease J06.9    Atherosclerosis of native coronary artery of native heart without angina pectoris I25.10    At high risk for falls Z91.81    Hypercholesterolemia E78.00    Hypertension I10    Nonexudative age-related macular degeneration, bilateral, intermediate dry stage H35.3132    Pulmonary hypertension I27.20     1. PLAN:   1. Met with patient's  of 46 yrs, three daughters and a LEROY. Mr. Maday Torres indicates his wife has executed an advance directive and would not want any further aggressive measures such as BIPAP. Viewing her decline over the last few months he believes she is ready to go. With this in mind he and the family ask to stop all aggressive measures including abx and focus purely on his wife's comfort and symptom management. Explained elements of comfort care plan and uncertainty of the time course once we stop curative treatment. Questions answered. Comfort orders placed. 3/21/18: If remains comfortable and hemodynamically stable can suggest home with hospice. 3/22/18: expectant management. 2. Initial consult note routed to primary continuity provider  3. Communicated plan of care with: Palliative IDT    GOALS OF CARE:  Patient/Health Care Proxy Stated Goals: Comfort      TREATMENT PREFERENCES:   Code Status: DNR    Advance Care Planning:  Advance Care Planning 3/20/2018   Patient's Healthcare Decision Maker is: Legal Next of Jorge 69   Primary Decision Maker Name An Torres   Primary Decision Maker Phone Number [phone]   Primary Decision Maker Relationship to Patient Spouse   Confirm Advance Directive Yes, not on file   Patient Would Like to Complete Advance Directive Unable   Does the patient have other document types Do Not Resuscitate       Medical Interventions: Comfort measures   Other Instructions:   Artificially Administered Nutrition: No feeding tube     Other:  As far as possible, the palliative care team has discussed with patient / health care proxy about goals of care / treatment preferences for patient. HISTORY:     History obtained from: family    CHIEF COMPLAINT: see summary    HPI/SUBJECTIVE:    The patient is:   [] Verbal and participatory  [x] Non-participatory due to:   obtunded     Clinical Pain Assessment (nonverbal scale for nonverbal patients):        Activity (Movement): Lying quietly, normal position    Duration: for how long has pt been experiencing pain (e.g., 2 days, 1 month, years)  Frequency: how often pain is an issue (e.g., several times per day, once every few days, constant)     FUNCTIONAL ASSESSMENT:     Palliative Performance Scale (PPS):  PPS: 20    [facility]        PSYCHOSOCIAL/SPIRITUAL SCREENING:      Any spiritual / Oriental orthodox concerns:  [] Yes /  [x] No    Caregiver Burnout:  [] Yes /  [x] No /  [] No Caregiver Present      Anticipatory grief assessment:   [x] Normal  / [] Maladaptive        REVIEW OF SYSTEMS:     Positive and pertinent negative findings in ROS are noted above in HPI. The following systems were [] reviewed / [x] unable to be reviewed as noted in HPI  Other findings are noted below. Systems: constitutional, ears/nose/mouth/throat, respiratory, gastrointestinal, genitourinary, musculoskeletal, integumentary, neurologic, psychiatric, endocrine. Positive findings noted below. Modified ESAS Completed by: provider                                   Stool Occurrence(s): 1        PHYSICAL EXAM:     Wt Readings from Last 3 Encounters:   03/22/18 51 kg (112 lb 7 oz)   08/06/14 57.2 kg (126 lb)   09/23/13 60.3 kg (133 lb)     Blood pressure 96/50, pulse 81, temperature 98 °F (36.7 °C), resp. rate 16, height 5' (1.524 m), weight 51 kg (112 lb 7 oz), SpO2 95 %. Pain:  Pain Scale 1: Adult Nonverbal Pain Scale  Pain Intensity 1: 1     Pain Location 1:  (generalized)        Pain Intervention(s) 1: Medication (see MAR)  Last bowel movement:     Constitutional: resting comfortably.    Eyes: pupils equal, anicteric  ENMT: no nasal discharge, moist mucous membranes  Cardiovascular: regular rhythm, distal pulses intact  Respiratory: breathing slightly labored, symmetric  Gastrointestinal: soft non-tender, +bowel sounds  Musculoskeletal: no deformity, no tenderness to palpation  Skin: hands mottled  Neurologic: not following commands,   Psychiatric:   Other:       HISTORY:     Active Problems:    Essential hypertension, benign ()      Aortic stenosis- moderate 2017 ()      Paroxysmal a-fib (HCC) (9/22/2013)      Chronic diastolic CHF (congestive heart failure) ([facility] [address] (9/24/2013)      Hypoxia (3/19/2018)      CAP (community acquired pneumonia) (3/19/2018)      Hyponatremia (3/19/2018)      Acute respiratory disease (3/19/2018)      Pulmonary hypertension (10/24/2017)      Past Medical History:   Diagnosis Date    Aortic stenosis     Essential hypertension, benign     Hyperlipidemia     Hypertension     Hypoxia       Past Surgical History:   Procedure Laterality Date    HX APPENDECTOMY      Age 25    HX BREAST BIOPSY      benign breast biopsy left side    HX HIP REPLACEMENT  2008      Family History   Problem Relation Age of Onset    Heart Failure Mother 78     History reviewed, no pertinent family history.   Social History   Substance Use Topics    Smoking status: Never Smoker    Smokeless tobacco: Never Used    Alcohol use No     No Known Allergies   Current Facility-Administered Medications   Medication Dose Route Frequency    albuterol-ipratropium (DUO-NEB) 2.5 MG-0.5 MG/3 ML  3 mL Nebulization Q6H PRN    haloperidol (HALDOL) 2 mg/mL oral solution 2 mg  2 mg SubLINGual Q6H PRN    Or    haloperidol lactate (HALDOL) injection 2 mg  2 mg IntraVENous Q6H PRN    LORazepam (INTENSOL) 2 mg/mL oral concentrate 1 mg  1 mg Oral Q1H PRN    acetaminophen (TYLENOL) tablet 650 mg  650 mg Oral Q4H PRN    Or    acetaminophen (TYLENOL) solution 650 mg  650 mg Oral Q4H PRN    Or    acetaminophen (TYLENOL) suppository 650 mg  650 mg Rectal Q4H PRN    morphine (ROXANOL) 100 mg/5 mL (20 mg/mL) concentrated solution 10 mg  10 mg Oral Q1H PRN    sodium chloride (NS) flush 5-10 mL  5-10 mL IntraVENous PRN        LAB AND IMAGING FINDINGS:     Lab Results   Component Value Date/Time    WBC 12.5 03/20/2018 02:18 AM    HGB 12.7 03/20/2018 02:18 AM    PLATELET 916 22/30/4393 02:18 AM     Lab Results   Component Value Date/Time    Sodium 137 03/20/2018 02:18 AM    Potassium 3.7 03/20/2018 02:18 AM    Chloride 100 03/20/2018 02:18 AM    CO2 30 03/20/2018 02:18 AM    BUN 21 (H) 03/20/2018 02:18 AM    Creatinine 0.85 03/20/2018 02:18 AM    Calcium 8.6 03/20/2018 02:18 AM      Lab Results   Component Value Date/Time    AST (SGOT) 46 (H) 03/18/2018 11:45 PM    Alk.  phosphatase 47 03/18/2018 11:45 PM    Protein, total 7.9 03/18/2018 11:45 PM    Albumin 3.5 03/18/2018 11:45 PM    Globulin 4.4 (H) 03/18/2018 11:45 PM     Lab Results   Component Value Date/Time    INR 1.1 09/22/2013 01:38 PM    Prothrombin time 13.7 09/22/2013 01:38 PM      No results found for: IRON, FE, TIBC, IBCT, PSAT, FERR   No results found for: PH, PCO2, PO2  No components found for: [place]   Lab Results   Component Value Date/Time    CK 60 09/22/2013 07:25 PM    CK - MB 1.6 09/22/2013 07:25 PM              Total time: 25min  Counseling / coordination time, spent as noted above: 20  > 50% counseling / coordination        Denis Ramirez MD  Palliative Medicine

## 2018-03-22 NOTE — PROGRESS NOTES
Death Pronouncement    Patient noted to be without spontaneous respirations or pulse on examination.  at 1002. Family at bedside.       Windy Burgos MD  Palliative Medicine

## 2018-03-22 NOTE — DISCHARGE SUMMARY
Discharge Summary    Patient: Marilia Nieves MRN: [id number]  CSN: [id number]    YOB: 1929  Age: 80 y.o.   Sex: female    DOA: 3/18/2018 LOS:  LOS: 3 days   Discharge Date:      Primary Care Provider:  Cosme Smith MD    Admission Diagnoses: Hypoxia  Acute respiratory disease  CAP (community acquired pneumonia)    Causes of Death:    Problem List as of 3/22/2018  Date Reviewed: 3/19/2018          Codes Class Noted - Resolved    Hypoxia ICD-10-CM: R09.02  ICD-9-CM: 799.02  3/19/2018 - Present        CAP (community acquired pneumonia) ICD-10-CM: J18.9  ICD-9-CM: 486  3/19/2018 - Present        Hyponatremia ICD-10-CM: E87.1  ICD-9-CM: 276.1  3/19/2018 - Present        Acute respiratory disease ICD-10-CM: J06.9  ICD-9-CM: 465.9  3/19/2018 - Present        Pulmonary hypertension ICD-10-CM: I27.20  ICD-9-CM: 416.8  10/24/2017 - Present        At high risk for falls ICD-10-CM: Z91.81  ICD-9-CM: V15.88  8/28/2017 - Present        Nonexudative age-related macular degeneration, bilateral, intermediate dry stage ICD-10-CM: H35.3132  ICD-9-CM: 362.51  8/28/2017 - Present        Atherosclerosis of native coronary artery of native heart without angina pectoris ICD-10-CM: I25.10  ICD-9-CM: 414.01  8/1/2017 - Present        Hypercholesterolemia ICD-10-CM: E78.00  ICD-9-CM: 272.0  11/18/2013 - Present        Hypertension ICD-10-CM: I10  ICD-9-CM: 401.9  11/18/2013 - Present        Chronic diastolic CHF (congestive heart failure) ([facility] ICD-10-CM: I50.32  ICD-9-CM: 428.32, 428.0  9/24/2013 - Present        Paroxysmal a-fib ([facility] ICD-10-CM: I48.0  ICD-9-CM: 427.31  9/22/2013 - Present        Essential hypertension, benign ICD-10-CM: I10  ICD-9-CM: 401.1  Unknown - Present        Aortic stenosis- moderate 2017 ICD-10-CM: I35.0  ICD-9-CM: 424.1  Unknown - Present        * (Principal)RESOLVED: Acute respiratory distress ICD-10-CM: R06.03  ICD-9-CM: 518.82  3/19/2018 - 3/20/2018        RESOLVED: ARF (acute renal failure) [facility]) ICD-10-CM: N17.9  ICD-9-CM: 584.9  9/22/2013 - 3/20/2018            Procedures : None    Consults: Palliative Medicine    Admission HPI : Marilia Luis is a 80 y.o. female who comes to [facility] ed with complaints of sob. Patient has been feeling sob for the past 4 days along with URI type of symptoms.  states when her symptoms started he went to the [facility] and got antitussive medications to help her cough but did not seem to help much. Over the course of last 3 days her sob became progressively worst to a point where she couldn't even move around in her bed much without getting sob.  states they lives alone at home and denies any sick contacts. She did have some low grade fever at home but did not check her temp. He thinks the patient should have came here 2 days ago but she was resisting at first but finally agreed to it today. In the ED she was noted to be significantly sob with hypoxia on RA sat in low 80s. She was given breathing tx, started on Abx and O2 supplement. She felt a little better after that. Denies any hx of smoking, alcohol and IVDA. Hospital Course : This patient was admitted to medical services on March 19, 2018 with respiratory distress and hypercarbia and hypoxia on room air. She had a CXR which showed an enlargement of the right hilum and nonspecific findings of bronchitis/reactive airway disease. Follow up chest CT showed findings in keeping with acute on chronic bronchitis. She was placed on IV abx and on supplemental oxygen. Over the first night of her admission, she had increasing confusion, so an ABG was obtained. The ABG obtained was a venous sample, but still showed hypercarbia and hypoxia even while on supplemental oxygen, with a decreased pH. The option was given to the patient's family member for using BiPAP to correct the acidosis and the hypercarbia/hypoxia.  Further discussion was held in regards to the patient's code status, the patient's  did not wish to proceed with any intervention, and signed a DNR. Palliative medicine was consulted, and the patient was made comfort care measures only. After this decision, all aggressive treatment was withdrawn. The patient was continued on supplemental oxygen and monitored. Ultimately, the patient passed from the above listed condition. Time of death was 2018 at 1002. Disposition: . Minutes spent on discharge: 40       Labs: Results:       Chemistry Recent Labs      18   GLU  144*   NA  137   K  3.7   CL  100   CO2  30   BUN  21*   CREA  0.85   CA  8.6   AGAP  7   BUCR  25*      CBC w/Diff Recent Labs      18   WBC  12.5   RBC  4.00*   HGB  12.7   HCT  40.3   PLT  181      Cardiac Enzymes No results for input(s): CPK, CKND1, RADHA in the last 72 hours. No lab exists for component: CKRMB, TROIP   Coagulation No results for input(s): PTP, INR, APTT in the last 72 hours. No lab exists for component: INREXT    Lipid Panel No results found for: CHOL, CHOLPOCT, CHOLX, CHLST, CHOLV, 956442, HDL, LDL, LDLC, DLDLP, 578239, VLDLC, VLDL, TGLX, TRIGL, TRIGP, TGLPOCT, CHHD, CHHDX   BNP No results for input(s): BNPP in the last 72 hours. Liver Enzymes No results for input(s): TP, ALB, TBIL, AP, SGOT, GPT in the last 72 hours. No lab exists for component: DBIL   Thyroid Studies Lab Results   Component Value Date/Time    TSH 2.89 2013 09:35 AM            Significant Diagnostic Studies: Ct Chest Wo Cont    Result Date: 3/20/2018  COMPARISON: Chest radiograph 3/18/2018; CTA chest 2012. INDICATION: Abnormal right hilar mass seen on prior chest radiograph TECHNIQUE: Axial was performed through the chest without contrast.  Coronal and sagittal reformations were generated. One or more dose reduction techniques were used on this CT: automated exposure control, adjustment of the mAs and/or kVp according to patient's size, and iterative reconstruction techniques.  The specific techniques utilized on this CT exam have been documented in the patient's electronic medical record. ============ LUNGS: Subtle groundglass interstitial nodularity is scattered throughout the posterior aspects of the right upper lobe. There are a few regions of tree-in-bud groundglass nodular opacity within the inferior right upper lobe, anterior right middle lobe, and throughout right lower lobe. The large majority of these appear to be related to more central adjacent endobronchial mucous plugging and bronchial wall thickening, probably alveolitis related to adjacent airway disease. Scattered groundglass opacity is also seen throughout left lower lobe basilar segments. No dense or more confluent airspace opacity throughout the lungs. No suspicious pulmonary nodule or mass. There are scattered calcified granulomas within right and left lungs. PLEURA: No pleural effusion or pneumothorax. AIRWAY: Moderately prominent bronchial wall thickening is seen throughout left and right lobar and segmental bronchi. There are several regions of bronchial mucus partial opacification and plugging. No bronchiectasis. MEDIASTINUM: Thyroid appears within normal limits. Patulous thoracic esophagus. Borderline global cardiomegaly. No pericardial effusion. Dense atherosclerotic calcification is seen throughout left anterior descending and left circumflex coronary artery distributions. Normal caliber thoracic aorta with moderate atherosclerotic calcification. Moderately prominent main pulmonary artery, measuring up to 3.4 cm. There is prominent central pulmonary vasculature is well, accounting for asymmetric right hilar convex masslike prominence seen on prior chest radiograph. No cephalization or vascular distribution to suggest acute pulmonary edema. LYMPH NODES: No mediastinal, hilar or axillary lymphadenopathy on this unenhanced CT. UPPER ABDOMEN: Small hiatal hernia. No acute abnormality in the visualized upper abdomen. OTHER: No acute osseous abnormality. Mild multilevel degenerative disc disease. ============     IMPRESSION: 1. Right hilar masslike opacity on prior chest radiograph represents prominent central right main pulmonary artery related to chronic pulmonary arterial hypertension. No solid right hilar mass or lymphadenopathy. 2. Several groundglass nodules and tree-in-bud nodularity are seen throughout the right greater than left lung, likely alveolitis related to moderately prominent acute on chronic bronchitis. Findings could also feasibly be secondary to intermittent aspiration pneumonitis given patulous thoracic esophagus. No segmental or lobar infiltrate. Xr Chest Port    Result Date: 3/19/2018  AP portable chest, 3/18/2018 at 2329 hours: Comparison 9/22/2013. Apparent enlargement of the right hilum is noted in the interval. Some prominence of perihilar lung markings and bronchial wall thickening is suggested. No pleural effusion. Top normal heart size. No definite congestive heart failure. IMPRESSION: 1. Apparent enlargement of the right hilum. Right hilar adenopathy/mass or overlying pneumonia are both possible. Recommend frontal and lateral plain film and/or chest CT with contrast for correlation. 2. Nonspecific perihilar changes of bronchitis or reactive airways disease. No results found for this or any previous visit.         CC: Rubi Cisneros MD
